# Patient Record
Sex: FEMALE | Race: WHITE | NOT HISPANIC OR LATINO | Employment: OTHER | URBAN - NONMETROPOLITAN AREA
[De-identification: names, ages, dates, MRNs, and addresses within clinical notes are randomized per-mention and may not be internally consistent; named-entity substitution may affect disease eponyms.]

---

## 2017-01-19 ENCOUNTER — OFFICE VISIT (OUTPATIENT)
Dept: OTOLARYNGOLOGY | Facility: CLINIC | Age: 57
End: 2017-01-19

## 2017-01-19 VITALS
BODY MASS INDEX: 44.11 KG/M2 | TEMPERATURE: 98.1 F | WEIGHT: 274.5 LBS | SYSTOLIC BLOOD PRESSURE: 137 MMHG | DIASTOLIC BLOOD PRESSURE: 93 MMHG | HEART RATE: 67 BPM | HEIGHT: 66 IN

## 2017-01-19 DIAGNOSIS — H93.13 TINNITUS, BILATERAL: ICD-10-CM

## 2017-01-19 DIAGNOSIS — R20.0 NUMBNESS: ICD-10-CM

## 2017-01-19 DIAGNOSIS — R51.9 HEADACHE, UNSPECIFIED HEADACHE TYPE: ICD-10-CM

## 2017-01-19 DIAGNOSIS — H57.11 EYE PAIN, RIGHT: ICD-10-CM

## 2017-01-19 DIAGNOSIS — H90.5 SNHL (SENSORINEURAL HEARING LOSS): Primary | ICD-10-CM

## 2017-01-19 PROCEDURE — 99213 OFFICE O/P EST LOW 20 MIN: CPT | Performed by: NURSE PRACTITIONER

## 2017-01-19 RX ORDER — MONTELUKAST SODIUM 10 MG/1
10 TABLET ORAL NIGHTLY
COMMUNITY
End: 2019-07-26 | Stop reason: SDUPTHER

## 2017-01-19 RX ORDER — EPINEPHRINE 0.15 MG/.3ML
INJECTION INTRAMUSCULAR
COMMUNITY

## 2017-01-19 RX ORDER — SIMVASTATIN 40 MG
TABLET ORAL
COMMUNITY
End: 2018-07-03

## 2017-01-19 RX ORDER — COVID-19 ANTIGEN TEST
KIT MISCELLANEOUS
COMMUNITY

## 2017-01-19 RX ORDER — DIPHENHYDRAMINE HCL 25 MG
TABLET ORAL EVERY 6 HOURS
COMMUNITY

## 2017-01-19 RX ORDER — ONDANSETRON 8 MG/1
TABLET, ORALLY DISINTEGRATING ORAL EVERY 8 HOURS
COMMUNITY

## 2017-01-19 RX ORDER — MECLIZINE HCL 12.5 MG/1
12.5 TABLET ORAL 3 TIMES DAILY PRN
COMMUNITY
End: 2019-06-17

## 2017-01-19 RX ORDER — PANTOPRAZOLE SODIUM 40 MG/1
TABLET, DELAYED RELEASE ORAL
COMMUNITY

## 2017-01-19 NOTE — PROGRESS NOTES
YOB: 1960  Location: [facility]  Location Address: [address] [place] [address], [facility], [address] [place] [address]  Location Phone: [phone]    Chief Complaint   Patient presents with   • Tinnitus       History of Present Illness  Alexandria Rizo is a 56 y.o. female.  Alexandria Rizo is here for follow up of ENT complaints. The patient has had problems with tinnitus, headaches.  The symptoms are localized to the right eye, centralized headaches. The patient has had severe symptoms. The symptoms have been present for the last 1 year . The symptoms are aggravated by  no identifiable factors . The symptoms are improved by no identifieable factors. She reports significantly worsening tinnitus not relieved by stopping aspirin or taking tinnitus formula.  She reports persistent headaches especially behind her right eye.  Has had some right facial numbness as well.  Denies vertigo.  Also had memory loss and trouble forming her words.  Denies worsening hearing.  Positive for  High pitched tinnitus, not pulsatile.       Past Medical History   Diagnosis Date   • Allergic rhinitis    • Arthritis    • Chronic rhinitis    • Deviated nasal septum    • Diabetes    • Diverticulosis of colon    • Dizziness    • GERD (gastroesophageal reflux disease)    • High cholesterol    • Hypertrophy of nasal turbinates    • Obstructive sleep apnea    • Peptic ulcer    • Polyposis of colon    • Sensorineural hearing loss    • Tinnitus        Past Surgical History   Procedure Laterality Date   • Appendectomy     • Cardiac catheterization     • Cholecystectomy     • Colon surgery     • Colonoscopy     • Endoscopy     • Hysterectomy     • Turbinoplasty           Current Outpatient Prescriptions:   •  diphenhydrAMINE (BENADRYL) 25 MG tablet, Every 6 (Six) Hours., Disp: , Rfl:   •  EPINEPHrine (EPIPEN JR) 0.15 MG/0.3ML solution auto-injector injection, Inject as directed, Disp: , Rfl:   •  meclizine (ANTIVERT) 12.5 MG tablet, Take 12.5 mg by  mouth 3 (Three) Times a Day As Needed for dizziness., Disp: , Rfl:   •  montelukast (SINGULAIR) 10 MG tablet, Take 10 mg by mouth Every Night., Disp: , Rfl:   •  Naproxen Sodium 220 MG capsule, Take by mouth, Disp: , Rfl:   •  ondansetron ODT (ZOFRAN-ODT) 8 MG disintegrating tablet, Every 8 (Eight) Hours., Disp: , Rfl:   •  pantoprazole (PROTONIX) 40 MG EC tablet, Take 40 mg by mouth daily.  , Disp: , Rfl:   •  simvastatin (ZOCOR) 40 MG tablet, Take 40 mg by mouth nightly.  , Disp: , Rfl:     Advair diskus [fluticasone-salmeterol]; Albuterol; Cymbalta [duloxetine hcl]; Dicyclomine; Lasix [furosemide]; Levaquin [levofloxacin]; Nasonex [mometasone furoate]; Spiriva handihaler [tiotropium bromide monohydrate]; Sulfa antibiotics; Symbicort [budesonide-formoterol fumarate]; and Topamax [topiramate]    Family History   Problem Relation Age of Onset   • Cancer Other    • Heart disease Other    • Hypertension Other    • Heart disease Mother    • Stroke Mother    • Heart disease Father        Social History     Social History   • Marital status:      Spouse name: N/A   • Number of children: N/A   • Years of education: N/A     Occupational History   • Not on file.     Social History Main Topics   • Smoking status: Never Smoker   • Smokeless tobacco: Not on file   • Alcohol use Yes      Comment: occaionally   • Drug use: Defer   • Sexual activity: Not on file     Other Topics Concern   • Not on file     Social History Narrative       Review of Systems   Constitutional: Negative.    HENT:        SEE HPI   Eyes: Negative.    Respiratory: Negative.    Cardiovascular: Negative.    Gastrointestinal: Negative.    Endocrine: Negative.    Genitourinary: Negative.    Musculoskeletal: Negative.    Skin: Negative.    Allergic/Immunologic: Negative.    Neurological: Negative.    Hematological: Negative.    Psychiatric/Behavioral: Negative.        Vitals:    01/19/17 0947   BP: 137/93   Pulse: 67   Temp: 98.1 °F (36.7 °C)        Objective     Physical Exam  CONSTITUTIONAL: well nourished, overweight,alert, oriented, in no acute distress     COMMUNICATION AND VOICE: able to communicate normally, normal voice quality    HEAD: normocephalic, no lesions, atraumatic, no tenderness, no masses     FACE: appearance normal, no lesions, no tenderness, no deformities, facial motion symmetric    SALIVARY GLANDS: parotid glands with no tenderness, no swelling, no masses, submandibular glands with normal size, nontender    EYES: ocular motility normal, eyelids normal, orbits normal, no proptosis, conjunctiva normal , pupils equal, round     EARS:  Hearing: response to conversational voice normal bilaterally   External Ears: auricles without lesions  Otoscopic: tympanic membrane appearance normal, no lesions, no perforation, normal mobility, no fluid    NOSE:  External Nose: structure normal, no tenderness on palpation, no nasal discharge, no lesions, no evidence of trauma, nostrils patent   Intranasal Exam: nasal mucosa normal, vestibule within normal limits, inferior turbinate normal, nasal septum midline     ORAL:  Lips: upper and lower lips without lesion   Teeth: dentition within normal limits for age   Gums: gingivae healthy   Oral Mucosa: oral mucosa normal, no mucosal lesions   Floor of Mouth: Warthin’s duct patent, mucosa normal  Tongue: lingual mucosa normal without lesions, normal tongue mobility   Palate: soft and hard palates with normal mucosa and structure  Oropharynx: oropharyngeal mucosa normal    NECK: neck appearance normal, no mass,  noted without erythema or tenderness    THYROID: no overt thyromegaly, no tenderness, nodules or mass present on palpation, position midline     LYMPH NODES: no lymphadenopathy    CHEST/RESPIRATORY: respiratory effort normal     CARDIOVASCULAR:  extremities without cyanosis or edema      NEUROLOGIC/PSYCHIATRIC: oriented to time, place and person, mood normal, affect appropriate, CN II-XII intact  grossly    Assessment/Plan   Alexandria was seen today for tinnitus.    Diagnoses and all orders for this visit:    SNHL (sensorineural hearing loss)  -     MRI Internal Auditory Canal With Wo; Future  -     MRI Brain With & Without Contrast; Future    Tinnitus, bilateral  -     MRI Internal Auditory Canal With Wo; Future  -     MRI Brain With & Without Contrast; Future    Persistent Headache, unspecified headache type  -     MRI Internal Auditory Canal With Wo; Future  -     MRI Brain With & Without Contrast; Future    Eye pain, right  -     MRI Internal Auditory Canal With Wo; Future  -     MRI Brain With & Without Contrast; Future    Numbness  -     MRI Internal Auditory Canal With Wo; Future  -     MRI Brain With & Without Contrast; Future      * Surgery not found *  Orders Placed This Encounter   Procedures   • MRI Internal Auditory Canal With Wo     Standing Status:   Future     Standing Expiration Date:   1/19/2018     Order Specific Question:   Reason for Exam:     Answer:   persistent headaches, right eye pain, memory loss, tinnitus, asymmetrical hearing loss     Order Specific Question:   Is the patient pregnant?     Answer:   No   • MRI Brain With & Without Contrast     Standing Status:   Future     Standing Expiration Date:   1/19/2018     Order Specific Question:   Reason for Exam:     Answer:   persistent headaches, right eye pain, memory loss, tinnitus, asymmetrical hearing loss     Order Specific Question:   Is the patient pregnant?     Answer:   No     Return in about 3 months (around 4/19/2017).       Patient Instructions   Tinnitus  Tinnitus refers to hearing a sound when there is no actual source for that sound. This is often described as ringing in the ears. However, people with this condition may hear a variety of noises. A person may hear the sound in one ear or in both ears.   The sounds of tinnitus can be soft, loud, or somewhere in between. Tinnitus can last for a few seconds or can be  constant for days. It may go away without treatment and come back at various times. When tinnitus is constant or happens often, it can lead to other problems, such as trouble sleeping and trouble concentrating.  Almost everyone experiences tinnitus at some point. Tinnitus that is long-lasting (chronic) or comes back often is a problem that may require medical attention.   CAUSES   The cause of tinnitus is often not known. In some cases, it can result from other problems or conditions, including:   · Exposure to loud noises from machinery, music, or other sources.  · Hearing loss.  · Ear or sinus infections.  · Earwax buildup.  · A foreign object in the ear.  · Use of certain medicines.  · Use of alcohol and caffeine.  · High blood pressure.  · Heart diseases.  · Anemia.  · Allergies.  · Meniere disease.  · Thyroid problems.  · Tumors.  · An enlarged part of a weakened blood vessel (aneurysm).  SYMPTOMS  The main symptom of tinnitus is hearing a sound when there is no source for that sound. It may sound like:   · Buzzing.  · Roaring.  · Ringing.  · Blowing air, similar to the sound heard when you listen to a seashell.  · Hissing.  · Whistling.  · Sizzling.  · Humming.  · Running water.  · A sustained musical note.  DIAGNOSIS   Tinnitus is diagnosed based on your symptoms. Your health care provider will do a physical exam. A comprehensive hearing exam (audiologic exam) will be done if your tinnitus:   · Affects only one ear (unilateral).  · Causes hearing difficulties.  · Lasts 6 months or longer.  You may also need to see a health care provider who specializes in hearing disorders (audiologist). You may be asked to complete a questionnaire to determine the severity of your tinnitus. Tests may be done to help determine the cause and to rule out other conditions. These can include:  · Imaging studies of your head and brain, such as:    A CT scan.    An MRI.  · An imaging study of your blood vessels  (angiogram).  TREATMENT   Treating an underlying medical condition can sometimes make tinnitus go away. If your tinnitus continues, other treatments may include:  · Medicines, such as certain antidepressants or sleeping aids.  · Sound generators to mask the tinnitus. These include:  ¨ Tabletop sound machines that play relaxing sounds to help you fall asleep.  ¨ Wearable devices that fit in your ear and play sounds or music.  ¨ A small device that uses headphones to deliver a signal embedded in music (acoustic neural stimulation). In time, this may change the pathways of your brain and make you less sensitive to tinnitus. This device is used for very severe cases when no other treatment is working.  · Therapy and counseling to help you manage the stress of living with tinnitus.  · Using hearing aids or cochlear implants, if your tinnitus is related to hearing loss.  HOME CARE INSTRUCTIONS  · When possible, avoid being in loud places and being exposed to loud sounds.  · Wear hearing protection, such as earplugs, when you are exposed to loud noises.  · Do not take stimulants, such as nicotine, alcohol, or caffeine.  · Practice techniques for reducing stress, such as meditation, yoga, or deep breathing.  · Use a white noise machine, a humidifier, or other devices to mask the sound of tinnitus.  · Sleep with your head slightly raised. This may reduce the impact of tinnitus.  · Try to get plenty of rest each night.  SEEK MEDICAL CARE IF:  · You have tinnitus in just one ear.  · Your tinnitus continues for 3 weeks or longer without stopping.  · Home care measures are not helping.  · You have tinnitus after a head injury.  · You have tinnitus along with any of the following:    Dizziness.    Loss of balance.    Nausea and vomiting.     This information is not intended to replace advice given to you by your health care provider. Make sure you discuss any questions you have with your health care provider.     Document  Released: 12/18/2006 Document Revised: 01/08/2016 Document Reviewed: 05/20/2015  [employer] Interactive Patient Education ©2016 [employer]    Tinnitus precautions discussed.  Will check MRI and review for abnormalities.  Call for problems or worsening symptoms

## 2017-01-19 NOTE — MR AVS SNAPSHOT
Alexandria JOAO Rizo   1/19/2017 10:00 AM   Office Visit    Dept Phone:  [phone]   Encounter #:  [id number]    Provider:  JAMAL Varner   Department:  [facility]                Your Full Care Plan              Your Updated Medication List          This list is accurate as of: 1/19/17 10:27 AM.  Always use your most recent med list.                diphenhydrAMINE 25 MG tablet   Commonly known as:  BENADRYL       EPINEPHrine 0.15 MG/0.3ML solution auto-injector injection   Commonly known as:  EPIPEN JR       meclizine 12.5 MG tablet   Commonly known as:  ANTIVERT       montelukast 10 MG tablet   Commonly known as:  SINGULAIR       Naproxen Sodium 220 MG capsule       ondansetron ODT 8 MG disintegrating tablet   Commonly known as:  ZOFRAN-ODT       pantoprazole 40 MG EC tablet   Commonly known as:  PROTONIX       simvastatin 40 MG tablet   Commonly known as:  ZOCOR               Instructions    Tinnitus  Tinnitus refers to hearing a sound when there is no actual source for that sound. This is often described as ringing in the ears. However, people with this condition may hear a variety of noises. A person may hear the sound in one ear or in both ears.   The sounds of tinnitus can be soft, loud, or somewhere in between. Tinnitus can last for a few seconds or can be constant for days. It may go away without treatment and come back at various times. When tinnitus is constant or happens often, it can lead to other problems, such as trouble sleeping and trouble concentrating.  Almost everyone experiences tinnitus at some point. Tinnitus that is long-lasting (chronic) or comes back often is a problem that may require medical attention.   CAUSES   The cause of tinnitus is often not known. In some cases, it can result from other problems or conditions, including:   · Exposure to loud noises from machinery, music, or other sources.  · Hearing loss.  · Ear or sinus  infections.  · Earwax buildup.  · A foreign object in the ear.  · Use of certain medicines.  · Use of alcohol and caffeine.  · High blood pressure.  · Heart diseases.  · Anemia.  · Allergies.  · Meniere disease.  · Thyroid problems.  · Tumors.  · An enlarged part of a weakened blood vessel (aneurysm).  SYMPTOMS  The main symptom of tinnitus is hearing a sound when there is no source for that sound. It may sound like:   · Buzzing.  · Roaring.  · Ringing.  · Blowing air, similar to the sound heard when you listen to a seashell.  · Hissing.  · Whistling.  · Sizzling.  · Humming.  · Running water.  · A sustained musical note.  DIAGNOSIS   Tinnitus is diagnosed based on your symptoms. Your health care provider will do a physical exam. A comprehensive hearing exam (audiologic exam) will be done if your tinnitus:   · Affects only one ear (unilateral).  · Causes hearing difficulties.  · Lasts 6 months or longer.  You may also need to see a health care provider who specializes in hearing disorders (audiologist). You may be asked to complete a questionnaire to determine the severity of your tinnitus. Tests may be done to help determine the cause and to rule out other conditions. These can include:  · Imaging studies of your head and brain, such as:    A CT scan.    An MRI.  · An imaging study of your blood vessels (angiogram).  TREATMENT   Treating an underlying medical condition can sometimes make tinnitus go away. If your tinnitus continues, other treatments may include:  · Medicines, such as certain antidepressants or sleeping aids.  · Sound generators to mask the tinnitus. These include:  ¨ Tabletop sound machines that play relaxing sounds to help you fall asleep.  ¨ Wearable devices that fit in your ear and play sounds or music.  ¨ A small device that uses headphones to deliver a signal embedded in music (acoustic neural stimulation). In time, this may change the pathways of your brain and make you less sensitive to  tinnitus. This device is used for very severe cases when no other treatment is working.  · Therapy and counseling to help you manage the stress of living with tinnitus.  · Using hearing aids or cochlear implants, if your tinnitus is related to hearing loss.  HOME CARE INSTRUCTIONS  · When possible, avoid being in loud places and being exposed to loud sounds.  · Wear hearing protection, such as earplugs, when you are exposed to loud noises.  · Do not take stimulants, such as nicotine, alcohol, or caffeine.  · Practice techniques for reducing stress, such as meditation, yoga, or deep breathing.  · Use a white noise machine, a humidifier, or other devices to mask the sound of tinnitus.  · Sleep with your head slightly raised. This may reduce the impact of tinnitus.  · Try to get plenty of rest each night.  SEEK MEDICAL CARE IF:  · You have tinnitus in just one ear.  · Your tinnitus continues for 3 weeks or longer without stopping.  · Home care measures are not helping.  · You have tinnitus after a head injury.  · You have tinnitus along with any of the following:    Dizziness.    Loss of balance.    Nausea and vomiting.     This information is not intended to replace advice given to you by your health care provider. Make sure you discuss any questions you have with your health care provider.     Document Released: 12/18/2006 Document Revised: 01/08/2016 Document Reviewed: 05/20/2015  [employer] Interactive Patient Education ©2016 [employer]       Patient Instructions History      Upcoming Appointments     Visit Type Date Time Department    FOLLOW UP 1/19/2017 10:00 AM [facility] [place]    FOLLOW UP 4/19/2017  9:00 AM [facility] [place]      [email or web address] Signup     [facility] [email or web address] allows you to send messages to your doctor, view your test results, renew your prescriptions, schedule appointments, and more. To sign up, go to [email or web address] and click on the Sign Up Now link in the New User? box. Enter your [email or web address]  "Activation Code exactly as it appears below along with the last four digits of your Social Security Number and your Date of Birth () to complete the sign-up process. If you do not sign up before the expiration date, you must request a new code.    Shira Activation Code: [email or web address]  Expires: 2017 10:23 AM    If you have questions, you can email [email or web address] or call [phone] to talk to our [employer] staff. Remember, [employer] is NOT to be used for urgent needs. For medical emergencies, dial 911.               Other Info from Your Visit           Your Appointments     2017  9:00 AM CDT   Follow Up with JAMAL Varner   [facility] (--)    [address] [place] [address]   [address]  [facility] [address] [phone]           Arrive 15 minutes prior to appointment.              Allergies     Advair Diskus [Fluticasone-salmeterol]      Albuterol      Cymbalta [Duloxetine Hcl]      Dicyclomine      Lasix [Furosemide]      Levaquin [Levofloxacin]      Nasonex [Mometasone Furoate]      Spiriva Handihaler [Tiotropium Bromide Monohydrate]      Sulfa Antibiotics      Symbicort [Budesonide-formoterol Fumarate]      Topamax [Topiramate]        Reason for Visit     Tinnitus           Vital Signs     Blood Pressure Pulse Temperature Height Weight Body Mass Index    137/93 67 98.1 °F (36.7 °C) 66\" (167.6 cm) 274 lb 8 oz (125 kg) 44.31 kg/m2    Smoking Status                   Never Smoker             "

## 2017-01-19 NOTE — PATIENT INSTRUCTIONS
Tinnitus  Tinnitus refers to hearing a sound when there is no actual source for that sound. This is often described as ringing in the ears. However, people with this condition may hear a variety of noises. A person may hear the sound in one ear or in both ears.   The sounds of tinnitus can be soft, loud, or somewhere in between. Tinnitus can last for a few seconds or can be constant for days. It may go away without treatment and come back at various times. When tinnitus is constant or happens often, it can lead to other problems, such as trouble sleeping and trouble concentrating.  Almost everyone experiences tinnitus at some point. Tinnitus that is long-lasting (chronic) or comes back often is a problem that may require medical attention.   CAUSES   The cause of tinnitus is often not known. In some cases, it can result from other problems or conditions, including:   · Exposure to loud noises from machinery, music, or other sources.  · Hearing loss.  · Ear or sinus infections.  · Earwax buildup.  · A foreign object in the ear.  · Use of certain medicines.  · Use of alcohol and caffeine.  · High blood pressure.  · Heart diseases.  · Anemia.  · Allergies.  · Meniere disease.  · Thyroid problems.  · Tumors.  · An enlarged part of a weakened blood vessel (aneurysm).  SYMPTOMS  The main symptom of tinnitus is hearing a sound when there is no source for that sound. It may sound like:   · Buzzing.  · Roaring.  · Ringing.  · Blowing air, similar to the sound heard when you listen to a seashell.  · Hissing.  · Whistling.  · Sizzling.  · Humming.  · Running water.  · A sustained musical note.  DIAGNOSIS   Tinnitus is diagnosed based on your symptoms. Your health care provider will do a physical exam. A comprehensive hearing exam (audiologic exam) will be done if your tinnitus:   · Affects only one ear (unilateral).  · Causes hearing difficulties.  · Lasts 6 months or longer.  You may also need to see a health care provider  who specializes in hearing disorders (audiologist). You may be asked to complete a questionnaire to determine the severity of your tinnitus. Tests may be done to help determine the cause and to rule out other conditions. These can include:  · Imaging studies of your head and brain, such as:    A CT scan.    An MRI.  · An imaging study of your blood vessels (angiogram).  TREATMENT   Treating an underlying medical condition can sometimes make tinnitus go away. If your tinnitus continues, other treatments may include:  · Medicines, such as certain antidepressants or sleeping aids.  · Sound generators to mask the tinnitus. These include:  ¨ Tabletop sound machines that play relaxing sounds to help you fall asleep.  ¨ Wearable devices that fit in your ear and play sounds or music.  ¨ A small device that uses headphones to deliver a signal embedded in music (acoustic neural stimulation). In time, this may change the pathways of your brain and make you less sensitive to tinnitus. This device is used for very severe cases when no other treatment is working.  · Therapy and counseling to help you manage the stress of living with tinnitus.  · Using hearing aids or cochlear implants, if your tinnitus is related to hearing loss.  HOME CARE INSTRUCTIONS  · When possible, avoid being in loud places and being exposed to loud sounds.  · Wear hearing protection, such as earplugs, when you are exposed to loud noises.  · Do not take stimulants, such as nicotine, alcohol, or caffeine.  · Practice techniques for reducing stress, such as meditation, yoga, or deep breathing.  · Use a white noise machine, a humidifier, or other devices to mask the sound of tinnitus.  · Sleep with your head slightly raised. This may reduce the impact of tinnitus.  · Try to get plenty of rest each night.  SEEK MEDICAL CARE IF:  · You have tinnitus in just one ear.  · Your tinnitus continues for 3 weeks or longer without stopping.  · Home care measures are not  helping.  · You have tinnitus after a head injury.  · You have tinnitus along with any of the following:    Dizziness.    Loss of balance.    Nausea and vomiting.     This information is not intended to replace advice given to you by your health care provider. Make sure you discuss any questions you have with your health care provider.     Document Released: 12/18/2006 Document Revised: 01/08/2016 Document Reviewed: 05/20/2015  [employer] Interactive Patient Education ©2016 [employer]    Tinnitus precautions discussed.  Will check MRI and review for abnormalities.  Call for problems or worsening symptoms

## 2017-01-30 ENCOUNTER — HOSPITAL ENCOUNTER (OUTPATIENT)
Dept: MRI IMAGING | Facility: HOSPITAL | Age: 57
Discharge: HOME | End: 2017-01-30
Admitting: NURSE PRACTITIONER

## 2017-01-30 ENCOUNTER — HOSPITAL ENCOUNTER (OUTPATIENT)
Dept: MRI IMAGING | Facility: HOSPITAL | Age: 57
Discharge: HOME | End: 2017-01-30

## 2017-01-30 DIAGNOSIS — H90.5 SNHL (SENSORINEURAL HEARING LOSS): ICD-10-CM

## 2017-01-30 DIAGNOSIS — R51.9 HEADACHE, UNSPECIFIED HEADACHE TYPE: ICD-10-CM

## 2017-01-30 DIAGNOSIS — H93.13 TINNITUS, BILATERAL: ICD-10-CM

## 2017-01-30 DIAGNOSIS — H57.11 EYE PAIN, RIGHT: ICD-10-CM

## 2017-01-30 DIAGNOSIS — R20.0 NUMBNESS: ICD-10-CM

## 2017-01-30 LAB — CREAT BLDA-MCNC: 0.8 MG/DL (ref 0.6–1.3)

## 2017-01-30 PROCEDURE — A9577 INJ MULTIHANCE: HCPCS | Performed by: NURSE PRACTITIONER

## 2017-01-30 PROCEDURE — 70553 MRI BRAIN STEM W/O & W/DYE: CPT

## 2017-01-30 PROCEDURE — 0 GADOBENATE DIMEGLUMINE 529 MG/ML SOLUTION: Performed by: NURSE PRACTITIONER

## 2017-01-30 PROCEDURE — 82565 ASSAY OF CREATININE: CPT

## 2017-01-30 RX ADMIN — GADOBENATE DIMEGLUMINE 20 ML: 529 INJECTION, SOLUTION INTRAVENOUS at 14:15

## 2017-01-31 DIAGNOSIS — N63.0 LUMP OR MASS IN BREAST: Primary | ICD-10-CM

## 2017-02-07 ENCOUNTER — TELEPHONE (OUTPATIENT)
Dept: SURGERY | Age: 57
End: 2017-02-07

## 2017-02-08 ENCOUNTER — HOSPITAL ENCOUNTER (OUTPATIENT)
Dept: WOMENS IMAGING | Age: 57
Discharge: HOME | End: 2017-02-08
Payer: MEDICARE

## 2017-02-08 DIAGNOSIS — N63.0 LUMP OR MASS IN BREAST: ICD-10-CM

## 2017-02-08 PROCEDURE — 76642 ULTRASOUND BREAST LIMITED: CPT

## 2017-02-27 ENCOUNTER — OFFICE VISIT (OUTPATIENT)
Dept: SURGERY | Age: 57
End: 2017-02-27
Payer: MEDICARE

## 2017-02-27 VITALS
SYSTOLIC BLOOD PRESSURE: 130 MMHG | BODY MASS INDEX: 44.84 KG/M2 | WEIGHT: 279 LBS | HEART RATE: 78 BPM | HEIGHT: 66 IN | RESPIRATION RATE: 20 BRPM | DIASTOLIC BLOOD PRESSURE: 72 MMHG

## 2017-02-27 DIAGNOSIS — N63.0 LUMP OR MASS IN BREAST: ICD-10-CM

## 2017-02-27 DIAGNOSIS — N60.19 DIFFUSE CYSTIC MASTOPATHY, UNSPECIFIED LATERALITY: ICD-10-CM

## 2017-02-27 DIAGNOSIS — Z12.31 VISIT FOR SCREENING MAMMOGRAM: Primary | ICD-10-CM

## 2017-02-27 DIAGNOSIS — N62 MACROMASTIA: ICD-10-CM

## 2017-02-27 PROCEDURE — G8419 CALC BMI OUT NRM PARAM NOF/U: HCPCS | Performed by: SURGERY

## 2017-02-27 PROCEDURE — 1036F TOBACCO NON-USER: CPT | Performed by: SURGERY

## 2017-02-27 PROCEDURE — G8427 DOCREV CUR MEDS BY ELIG CLIN: HCPCS | Performed by: SURGERY

## 2017-02-27 PROCEDURE — 99213 OFFICE O/P EST LOW 20 MIN: CPT | Performed by: SURGERY

## 2017-02-27 PROCEDURE — 3017F COLORECTAL CA SCREEN DOC REV: CPT | Performed by: SURGERY

## 2017-02-27 PROCEDURE — 3014F SCREEN MAMMO DOC REV: CPT | Performed by: SURGERY

## 2017-02-27 PROCEDURE — G8484 FLU IMMUNIZE NO ADMIN: HCPCS | Performed by: SURGERY

## 2017-02-27 PROCEDURE — G8599 NO ASA/ANTIPLAT THER USE RNG: HCPCS | Performed by: SURGERY

## 2017-04-19 ENCOUNTER — OFFICE VISIT (OUTPATIENT)
Dept: OTOLARYNGOLOGY | Facility: CLINIC | Age: 57
End: 2017-04-19

## 2017-04-19 VITALS
TEMPERATURE: 98.7 F | BODY MASS INDEX: 44.6 KG/M2 | WEIGHT: 277.5 LBS | HEART RATE: 84 BPM | SYSTOLIC BLOOD PRESSURE: 160 MMHG | DIASTOLIC BLOOD PRESSURE: 89 MMHG | HEIGHT: 66 IN

## 2017-04-19 DIAGNOSIS — R51.9 HEADACHE, UNSPECIFIED HEADACHE TYPE: ICD-10-CM

## 2017-04-19 DIAGNOSIS — H90.5 SNHL (SENSORINEURAL HEARING LOSS): ICD-10-CM

## 2017-04-19 DIAGNOSIS — H57.11 EYE PAIN, RIGHT: ICD-10-CM

## 2017-04-19 DIAGNOSIS — H93.13 TINNITUS, BILATERAL: Primary | ICD-10-CM

## 2017-04-19 DIAGNOSIS — R20.0 NUMBNESS: ICD-10-CM

## 2017-04-19 PROCEDURE — 99213 OFFICE O/P EST LOW 20 MIN: CPT | Performed by: NURSE PRACTITIONER

## 2017-04-19 RX ORDER — ALBUTEROL SULFATE 90 UG/1
2 AEROSOL, METERED RESPIRATORY (INHALATION) EVERY 4 HOURS PRN
COMMUNITY

## 2017-04-19 RX ORDER — CALCIUM CARBONATE 750 MG/1
750 TABLET, CHEWABLE ORAL DAILY
COMMUNITY
End: 2019-06-17

## 2017-04-19 RX ORDER — EPINEPHRINE 0.15 MG/.3ML
INJECTION INTRAMUSCULAR
COMMUNITY
End: 2017-04-19 | Stop reason: ALTCHOICE

## 2017-04-19 RX ORDER — ASPIRIN 81 MG/1
81 TABLET, CHEWABLE ORAL DAILY
COMMUNITY

## 2017-04-19 NOTE — PROGRESS NOTES
YOB: 1960  Location: [facility]  Location Address: [address] [place] [address], [facility], [address] [place] [address]  Location Phone: [phone]    Chief Complaint   Patient presents with   • Ear Problem       History of Present Illness  Alexandria Rizo is a 56 y.o. female.  Alexandria Rizo is here for follow up of ENT complaints. The patient has had problems with tinnitus, headaches.  The symptoms are localized to the right eye, centralized headaches. The patient has had severe symptoms. The symptoms have been present for the last 1 year . The symptoms are aggravated by  no identifiable factors . The symptoms are improved by no identifieable factors. She reports significantly worsening tinnitus not relieved by stopping aspirin or taking tinnitus formula.  She reports persistent headaches especially behind her right eye.  Has had some right facial numbness as well.  Denies vertigo.  Also had memory loss and trouble forming her words.  Denies worsening hearing.  Positive for  High pitched tinnitus, not pulsatile.  She has been on nasal a couple course of oral abx since her last visit. She is not using her nasal sprays as prescribed.        MRI Brain With & Without Contrast [[id number]] (Order [id number])   Status: Final result   PACS Images   Radiology Images   Study Result   EXAMINATION: MRI BRAIN W WO CONTRAST-, MRI INTERNAL AUDITORY CANAL W WO-  2017 3:13 PM CST       Clinical History: SNHL (sensorineural hearing loss) [H90.5 (ICD-10-CM)];  Tinnitus, bilateral [H93.13 (ICD-10-CM)]; Persistent Headache,  unspecified headache type [R51 (ICD-10-CM)]; Eye pain, right [H57.11  (ICD-10-CM)]; Numbness [R20.0 (ICD-10-CM)]  persistent headaches, right eye pain, memory loss, tinnitus,  asymmetrical hearing loss; H90.5-Unspecified sensorineural hearing loss;  H93.13-Tinnitus, bilateral; R51-Headache; H57.11-Ocular pain, right eye;  R20.0-Anesthesia of skin      Comparison Study: 2013.       Technique: Multiplanar,  multisequence MR imaging of the brain and  internal auditory canals was obtained before and after the intravenous  infusion of gadolinium contrast.      Findings:   Midline brain is nondisplaced. Ventricles are normal in size, symmetry,  and configuration. Basilar cisterns are preserved. Gray and white matter  demonstrates a few scattered foci of increased FLAIR signal intensity  suggesting mild chronic microvascular ischemia. No masses or areas of  midline shift. No abnormal areas of postcontrast enhancement within the  brain. Incidental note of partially empty sella configuration.  Incidental note of air-fluid level in the sphenoid sinus.      Brainstem is normal in signal. Surrounding structures are unremarkable.       Internal auditory canals demonstrate normal vascular and neural  structures with no evidence of enhancing mass lesion. The inner ear  morphology and signal are normal.       Vestibular aqueducts are not demonstrated and are not likely to be  enlarged. Vascular flow-voids are appropriate.       IMPRESSION:  Impression:   1. Very mild changes of chronic microvascular ischemia are suspected.  2. Probable mild sphenoid sinusitis.  3. No enhancing masses or other abnormalities demonstrated in the IACs.          This report was finalized on 01/30/2017 15:44 by Dr. Yosi Zuniga MD.            Past Medical History:   Diagnosis Date   • Allergic rhinitis    • Arthritis    • Chronic rhinitis    • Deviated nasal septum    • Diabetes    • Diverticulosis of colon    • Dizziness    • GERD (gastroesophageal reflux disease)    • High cholesterol    • Hypertrophy of nasal turbinates    • Obstructive sleep apnea    • Peptic ulcer    • Polyposis of colon    • Sensorineural hearing loss    • Tinnitus        Past Surgical History:   Procedure Laterality Date   • APPENDECTOMY     • CARDIAC CATHETERIZATION     • CHOLECYSTECTOMY     • COLON SURGERY     • COLONOSCOPY     • ENDOSCOPY     • HYSTERECTOMY     •  TURBINOPLASTY           Current Outpatient Prescriptions:   •  albuterol (PROVENTIL HFA;VENTOLIN HFA) 108 (90 BASE) MCG/ACT inhaler, Inhale 2 puffs Every 4 (Four) Hours As Needed for Wheezing., Disp: , Rfl:   •  aspirin 81 MG chewable tablet, Chew 81 mg Daily., Disp: , Rfl:   •  calcium carbonate EX (TUMS EX) 750 MG chewable tablet, Chew 750 mg Daily., Disp: , Rfl:   •  diphenhydrAMINE (BENADRYL) 25 MG tablet, Every 6 (Six) Hours., Disp: , Rfl:   •  EPINEPHrine (EPIPEN JR) 0.15 MG/0.3ML solution auto-injector injection, Inject as directed, Disp: , Rfl:   •  meclizine (ANTIVERT) 12.5 MG tablet, Take 12.5 mg by mouth 3 (Three) Times a Day As Needed for dizziness., Disp: , Rfl:   •  montelukast (SINGULAIR) 10 MG tablet, Take 10 mg by mouth Every Night., Disp: , Rfl:   •  Multiple Vitamins-Minerals (MULTIVITAL PO), Take  by mouth., Disp: , Rfl:   •  Naproxen Sodium 220 MG capsule, Take by mouth, Disp: , Rfl:   •  ondansetron ODT (ZOFRAN-ODT) 8 MG disintegrating tablet, Every 8 (Eight) Hours., Disp: , Rfl:   •  pantoprazole (PROTONIX) 40 MG EC tablet, Take 40 mg by mouth daily.  , Disp: , Rfl:   •  simvastatin (ZOCOR) 40 MG tablet, Take 40 mg by mouth nightly.  , Disp: , Rfl:     Nasonex [mometasone furoate]; Spiriva handihaler [tiotropium bromide monohydrate]; Aspergillus (mixed) allergy skin test; Advair diskus [fluticasone-salmeterol]; Albuterol; Albuterol sulfate; Ampicillin; Beclomethasone; Cymbalta [duloxetine hcl]; Dicyclomine; Lasix [furosemide]; Levaquin [levofloxacin]; Sulfa antibiotics; Symbicort [budesonide-formoterol fumarate]; and Topamax [topiramate]    Family History   Problem Relation Age of Onset   • Cancer Other    • Heart disease Other    • Hypertension Other    • Heart disease Mother    • Stroke Mother    • Heart disease Father        Social History     Social History   • Marital status:      Spouse name: N/A   • Number of children: N/A   • Years of education: N/A     Occupational History   •  Not on file.     Social History Main Topics   • Smoking status: Never Smoker   • Smokeless tobacco: Not on file   • Alcohol use Yes      Comment: socially   • Drug use: Defer   • Sexual activity: Not on file     Other Topics Concern   • Not on file     Social History Narrative       Review of Systems   Constitutional: Negative.    HENT:        SEE HPI   Eyes: Negative.    Respiratory: Negative.    Cardiovascular: Negative.    Gastrointestinal: Negative.    Endocrine: Negative.    Genitourinary: Negative.    Musculoskeletal: Negative.    Skin: Negative.    Allergic/Immunologic: Negative.    Neurological: Negative.    Hematological: Negative.    Psychiatric/Behavioral: Negative.        Vitals:    04/19/17 0857   BP: 160/89   Pulse: 84   Temp: 98.7 °F (37.1 °C)       Objective     Physical Exam  CONSTITUTIONAL: well nourished, overweight,alert, oriented, in no acute distress     COMMUNICATION AND VOICE: able to communicate normally, normal voice quality    HEAD: normocephalic, no lesions, atraumatic, no tenderness, no masses     FACE: appearance normal, no lesions, no tenderness, no deformities, facial motion symmetric    SALIVARY GLANDS: parotid glands with no tenderness, no swelling, no masses, submandibular glands with normal size, nontender    EYES: ocular motility normal, eyelids normal, orbits normal, no proptosis, conjunctiva normal , pupils equal, round     EARS:  Hearing: response to conversational voice normal bilaterally   External Ears: auricles without lesions  Otoscopic: tympanic membrane appearance normal, no lesions, no perforation, normal mobility, no fluid    NOSE:  External Nose: structure normal, no tenderness on palpation, no nasal discharge, no lesions, no evidence of trauma, nostrils patent   Intranasal Exam: nasal mucosa edema, vestibule within normal limits, inferior turbinate normal, nasal septum midline     ORAL:  Lips: upper and lower lips without lesion   Teeth: dentition within normal  limits for age   Gums: gingivae healthy   Oral Mucosa: oral mucosa normal, no mucosal lesions   Floor of Mouth: Warthin’s duct patent, mucosa normal  Tongue: lingual mucosa normal without lesions, normal tongue mobility   Palate: soft and hard palates with normal mucosa and structure  Oropharynx: oropharyngeal mucosa normal    NECK: neck appearance normal, no mass,  noted without erythema or tenderness    THYROID: no overt thyromegaly, no tenderness, nodules or mass present on palpation, position midline     LYMPH NODES: no lymphadenopathy    CHEST/RESPIRATORY: respiratory effort normal     CARDIOVASCULAR:  extremities without cyanosis or edema      NEUROLOGIC/PSYCHIATRIC: oriented to time, place and person, mood normal, affect appropriate, CN II-XII intact grossly    Assessment/Plan   Alexandria was seen today for ear problem.    Diagnoses and all orders for this visit:    Tinnitus, bilateral    SNHL (sensorineural hearing loss)    Numbness    Eye pain, right    Persistent Headache, unspecified headache type      * Surgery not found *  No orders of the defined types were placed in this encounter.    No Follow-up on file.       Patient Instructions   Discussed importance of nasal sprays.  Suspect her balance problems maybe more r/t to her diabetes and poss peripheral neuropathy.  Recommend to continue current tx and try Nasacort.  Recommend BP control as well - she is only taking her Bumex as needed    Avoid loud noise exposure. Protect hearing in with extreme loud noise.   Recommend fan, sound machine, white noise from TV for tinnitus masking. Avoid excessive caffeine, decrease stress level, monitor BP regularly, routine sleep schedule. Low Sodium Diet.   Tinnitus Handout   Hearing loss handout   Appt with hearing aid specialist for hearing aid fitting when patient so desires.   Call for change or worsening symptoms not controlled by current treatment regimen.

## 2017-04-19 NOTE — PATIENT INSTRUCTIONS
Discussed importance of nasal sprays.  Suspect her balance problems maybe more r/t to her diabetes and poss peripheral neuropathy.  Recommend to continue current tx and try Nasacort.  Recommend BP control as well - she is only taking her Bumex as needed    Avoid loud noise exposure. Protect hearing in with extreme loud noise.   Recommend fan, sound machine, white noise from TV for tinnitus masking. Avoid excessive caffeine, decrease stress level, monitor BP regularly, routine sleep schedule. Low Sodium Diet.   Tinnitus Handout   Hearing loss handout   Appt with hearing aid specialist for hearing aid fitting when patient so desires.   Call for change or worsening symptoms not controlled by current treatment regimen.

## 2017-04-24 DIAGNOSIS — N63.0 LUMP OR MASS IN BREAST: Primary | ICD-10-CM

## 2017-04-25 ENCOUNTER — TELEPHONE (OUTPATIENT)
Dept: SURGERY | Age: 57
End: 2017-04-25

## 2017-05-08 ENCOUNTER — OFFICE VISIT (OUTPATIENT)
Dept: NEUROLOGY | Age: 57
End: 2017-05-08
Payer: MEDICARE

## 2017-05-08 VITALS
WEIGHT: 274 LBS | SYSTOLIC BLOOD PRESSURE: 124 MMHG | RESPIRATION RATE: 18 BRPM | OXYGEN SATURATION: 97 % | HEART RATE: 71 BPM | DIASTOLIC BLOOD PRESSURE: 89 MMHG | HEIGHT: 66 IN | BODY MASS INDEX: 44.03 KG/M2

## 2017-05-08 DIAGNOSIS — H81.313 VERTIGO, AURAL, BILATERAL: ICD-10-CM

## 2017-05-08 DIAGNOSIS — G45.1 HEMISPHERIC CAROTID ARTERY SYNDROME: ICD-10-CM

## 2017-05-08 DIAGNOSIS — G43.019 INTRACTABLE MIGRAINE WITHOUT AURA AND WITHOUT STATUS MIGRAINOSUS: ICD-10-CM

## 2017-05-08 DIAGNOSIS — G25.81 RESTLESS LEGS SYNDROME: ICD-10-CM

## 2017-05-08 DIAGNOSIS — R41.3 MEMORY LOSS: ICD-10-CM

## 2017-05-08 DIAGNOSIS — G47.33 SLEEP APNEA, OBSTRUCTIVE: Primary | ICD-10-CM

## 2017-05-08 DIAGNOSIS — E11.42 DIABETIC POLYNEUROPATHY ASSOCIATED WITH TYPE 2 DIABETES MELLITUS (HCC): ICD-10-CM

## 2017-05-08 LAB
FOLATE: >20 NG/ML (ref 4.8–37.3)
SEDIMENTATION RATE, ERYTHROCYTE: 31 MM/HR (ref 0–25)
TSH SERPL DL<=0.05 MIU/L-ACNC: 1.03 UIU/ML (ref 0.27–4.2)
VITAMIN B-12: 833 PG/ML (ref 211–946)

## 2017-05-08 PROCEDURE — 99214 OFFICE O/P EST MOD 30 MIN: CPT | Performed by: PSYCHIATRY & NEUROLOGY

## 2017-05-08 PROCEDURE — 3014F SCREEN MAMMO DOC REV: CPT | Performed by: PSYCHIATRY & NEUROLOGY

## 2017-05-08 PROCEDURE — 3046F HEMOGLOBIN A1C LEVEL >9.0%: CPT | Performed by: PSYCHIATRY & NEUROLOGY

## 2017-05-08 PROCEDURE — 3017F COLORECTAL CA SCREEN DOC REV: CPT | Performed by: PSYCHIATRY & NEUROLOGY

## 2017-05-08 PROCEDURE — G8598 ASA/ANTIPLAT THER USED: HCPCS | Performed by: PSYCHIATRY & NEUROLOGY

## 2017-05-08 PROCEDURE — G8417 CALC BMI ABV UP PARAM F/U: HCPCS | Performed by: PSYCHIATRY & NEUROLOGY

## 2017-05-08 PROCEDURE — 1036F TOBACCO NON-USER: CPT | Performed by: PSYCHIATRY & NEUROLOGY

## 2017-05-08 PROCEDURE — G8427 DOCREV CUR MEDS BY ELIG CLIN: HCPCS | Performed by: PSYCHIATRY & NEUROLOGY

## 2017-05-08 RX ORDER — CYCLOBENZAPRINE HCL 10 MG
10 TABLET ORAL 3 TIMES DAILY PRN
COMMUNITY

## 2017-05-08 RX ORDER — MONTELUKAST SODIUM 10 MG/1
10 TABLET ORAL NIGHTLY
COMMUNITY

## 2017-05-08 RX ORDER — BUMETANIDE 2 MG/1
2 TABLET ORAL DAILY
COMMUNITY

## 2017-05-08 RX ORDER — CALCIUM CARBONATE 500(1250)
500 TABLET ORAL DAILY
COMMUNITY
End: 2017-09-21

## 2017-05-11 ENCOUNTER — TELEPHONE (OUTPATIENT)
Dept: NEUROLOGY | Age: 57
End: 2017-05-11

## 2017-05-12 ENCOUNTER — HOSPITAL ENCOUNTER (OUTPATIENT)
Dept: WOMENS IMAGING | Age: 57
Discharge: HOME | End: 2017-05-12
Payer: MEDICARE

## 2017-05-12 ENCOUNTER — OFFICE VISIT (OUTPATIENT)
Dept: SURGERY | Age: 57
End: 2017-05-12
Payer: MEDICARE

## 2017-05-12 VITALS
HEIGHT: 66 IN | BODY MASS INDEX: 44.84 KG/M2 | HEART RATE: 80 BPM | WEIGHT: 279 LBS | RESPIRATION RATE: 18 BRPM | SYSTOLIC BLOOD PRESSURE: 140 MMHG | DIASTOLIC BLOOD PRESSURE: 82 MMHG

## 2017-05-12 DIAGNOSIS — R92.8 ABNORMAL MAMMOGRAM: ICD-10-CM

## 2017-05-12 DIAGNOSIS — N63.0 LUMP OR MASS IN BREAST: ICD-10-CM

## 2017-05-12 DIAGNOSIS — N60.19 DIFFUSE CYSTIC MASTOPATHY, UNSPECIFIED LATERALITY: ICD-10-CM

## 2017-05-12 DIAGNOSIS — N62 MACROMASTIA: ICD-10-CM

## 2017-05-12 DIAGNOSIS — N63.0 LUMP OR MASS IN BREAST: Primary | ICD-10-CM

## 2017-05-12 PROCEDURE — 1036F TOBACCO NON-USER: CPT | Performed by: SURGERY

## 2017-05-12 PROCEDURE — G8417 CALC BMI ABV UP PARAM F/U: HCPCS | Performed by: SURGERY

## 2017-05-12 PROCEDURE — 3017F COLORECTAL CA SCREEN DOC REV: CPT | Performed by: SURGERY

## 2017-05-12 PROCEDURE — 99213 OFFICE O/P EST LOW 20 MIN: CPT | Performed by: SURGERY

## 2017-05-12 PROCEDURE — G8427 DOCREV CUR MEDS BY ELIG CLIN: HCPCS | Performed by: SURGERY

## 2017-05-12 PROCEDURE — 76642 ULTRASOUND BREAST LIMITED: CPT

## 2017-05-12 PROCEDURE — 3014F SCREEN MAMMO DOC REV: CPT | Performed by: SURGERY

## 2017-05-12 PROCEDURE — G8598 ASA/ANTIPLAT THER USED: HCPCS | Performed by: SURGERY

## 2017-05-12 RX ORDER — PERPHENAZINE/AMITRIPTYLINE HCL 4MG-10MG
1000 TABLET ORAL 2 TIMES DAILY
COMMUNITY
End: 2017-09-21

## 2017-05-17 ENCOUNTER — HOSPITAL ENCOUNTER (OUTPATIENT)
Dept: WOMENS IMAGING | Age: 57
Discharge: HOME | End: 2017-05-17
Payer: MEDICARE

## 2017-05-17 DIAGNOSIS — N63.0 LUMP OR MASS IN BREAST: ICD-10-CM

## 2017-05-17 PROCEDURE — G0206 DX MAMMO INCL CAD UNI: HCPCS

## 2017-05-17 PROCEDURE — 88305 TISSUE EXAM BY PATHOLOGIST: CPT

## 2017-05-17 PROCEDURE — 19083 BX BREAST 1ST LESION US IMAG: CPT

## 2017-05-22 ENCOUNTER — TELEPHONE (OUTPATIENT)
Dept: SURGERY | Age: 57
End: 2017-05-22

## 2017-05-26 ENCOUNTER — HOSPITAL ENCOUNTER (OUTPATIENT)
Dept: VASCULAR LAB | Age: 57
Discharge: HOME | End: 2017-05-26
Payer: MEDICARE

## 2017-05-26 DIAGNOSIS — G45.1 HEMISPHERIC CAROTID ARTERY SYNDROME: ICD-10-CM

## 2017-05-26 PROCEDURE — 93880 EXTRACRANIAL BILAT STUDY: CPT

## 2017-05-30 ENCOUNTER — TELEPHONE (OUTPATIENT)
Dept: NEUROLOGY | Age: 57
End: 2017-05-30

## 2017-06-05 ENCOUNTER — OFFICE VISIT (OUTPATIENT)
Dept: SURGERY | Age: 57
End: 2017-06-05
Payer: MEDICARE

## 2017-06-05 VITALS — DIASTOLIC BLOOD PRESSURE: 60 MMHG | HEART RATE: 76 BPM | SYSTOLIC BLOOD PRESSURE: 110 MMHG

## 2017-06-05 DIAGNOSIS — N60.11 FIBROCYSTIC DISEASE OF RIGHT BREAST: Primary | ICD-10-CM

## 2017-06-05 PROCEDURE — 99212 OFFICE O/P EST SF 10 MIN: CPT | Performed by: PHYSICIAN ASSISTANT

## 2017-06-05 PROCEDURE — 1036F TOBACCO NON-USER: CPT | Performed by: PHYSICIAN ASSISTANT

## 2017-06-05 PROCEDURE — G8417 CALC BMI ABV UP PARAM F/U: HCPCS | Performed by: PHYSICIAN ASSISTANT

## 2017-06-05 PROCEDURE — 3017F COLORECTAL CA SCREEN DOC REV: CPT | Performed by: PHYSICIAN ASSISTANT

## 2017-06-05 PROCEDURE — G8598 ASA/ANTIPLAT THER USED: HCPCS | Performed by: PHYSICIAN ASSISTANT

## 2017-06-05 PROCEDURE — 3014F SCREEN MAMMO DOC REV: CPT | Performed by: PHYSICIAN ASSISTANT

## 2017-06-05 PROCEDURE — G8428 CUR MEDS NOT DOCUMENT: HCPCS | Performed by: PHYSICIAN ASSISTANT

## 2017-06-15 ENCOUNTER — OFFICE VISIT (OUTPATIENT)
Dept: NEUROLOGY | Age: 57
End: 2017-06-15
Payer: MEDICARE

## 2017-06-15 VITALS
HEIGHT: 66 IN | BODY MASS INDEX: 44.52 KG/M2 | HEART RATE: 68 BPM | WEIGHT: 277 LBS | RESPIRATION RATE: 18 BRPM | DIASTOLIC BLOOD PRESSURE: 64 MMHG | SYSTOLIC BLOOD PRESSURE: 102 MMHG

## 2017-06-15 DIAGNOSIS — G25.81 RESTLESS LEGS SYNDROME: ICD-10-CM

## 2017-06-15 DIAGNOSIS — E11.42 DIABETIC POLYNEUROPATHY ASSOCIATED WITH TYPE 2 DIABETES MELLITUS (HCC): Primary | ICD-10-CM

## 2017-06-15 DIAGNOSIS — G43.119 INTRACTABLE MIGRAINE WITH AURA WITHOUT STATUS MIGRAINOSUS: ICD-10-CM

## 2017-06-15 DIAGNOSIS — G47.33 SLEEP APNEA, OBSTRUCTIVE: ICD-10-CM

## 2017-06-15 DIAGNOSIS — R29.810 FACIAL WEAKNESS: ICD-10-CM

## 2017-06-15 PROCEDURE — 1036F TOBACCO NON-USER: CPT | Performed by: PSYCHIATRY & NEUROLOGY

## 2017-06-15 PROCEDURE — 3017F COLORECTAL CA SCREEN DOC REV: CPT | Performed by: PSYCHIATRY & NEUROLOGY

## 2017-06-15 PROCEDURE — 3046F HEMOGLOBIN A1C LEVEL >9.0%: CPT | Performed by: PSYCHIATRY & NEUROLOGY

## 2017-06-15 PROCEDURE — G8427 DOCREV CUR MEDS BY ELIG CLIN: HCPCS | Performed by: PSYCHIATRY & NEUROLOGY

## 2017-06-15 PROCEDURE — 99214 OFFICE O/P EST MOD 30 MIN: CPT | Performed by: PSYCHIATRY & NEUROLOGY

## 2017-06-15 PROCEDURE — G8417 CALC BMI ABV UP PARAM F/U: HCPCS | Performed by: PSYCHIATRY & NEUROLOGY

## 2017-06-15 PROCEDURE — 3014F SCREEN MAMMO DOC REV: CPT | Performed by: PSYCHIATRY & NEUROLOGY

## 2017-06-15 PROCEDURE — G8598 ASA/ANTIPLAT THER USED: HCPCS | Performed by: PSYCHIATRY & NEUROLOGY

## 2017-06-15 RX ORDER — PREGABALIN 50 MG/1
50 CAPSULE ORAL 2 TIMES DAILY
Qty: 60 CAPSULE | Refills: 5 | Status: SHIPPED | OUTPATIENT
Start: 2017-06-15 | End: 2017-07-15

## 2017-06-23 ENCOUNTER — TELEPHONE (OUTPATIENT)
Dept: NEUROLOGY | Age: 57
End: 2017-06-23

## 2017-06-30 ENCOUNTER — HOSPITAL ENCOUNTER (OUTPATIENT)
Dept: MRI IMAGING | Age: 57
Discharge: HOME | End: 2017-06-30
Payer: MEDICARE

## 2017-06-30 DIAGNOSIS — G43.119 INTRACTABLE MIGRAINE WITH AURA WITHOUT STATUS MIGRAINOSUS: ICD-10-CM

## 2017-06-30 DIAGNOSIS — R29.810 FACIAL WEAKNESS: ICD-10-CM

## 2017-06-30 PROCEDURE — 70544 MR ANGIOGRAPHY HEAD W/O DYE: CPT

## 2017-07-07 ENCOUNTER — TELEPHONE (OUTPATIENT)
Dept: NEUROLOGY | Age: 57
End: 2017-07-07

## 2017-09-21 ENCOUNTER — OFFICE VISIT (OUTPATIENT)
Dept: NEUROLOGY | Age: 57
End: 2017-09-21
Payer: MEDICARE

## 2017-09-21 VITALS
SYSTOLIC BLOOD PRESSURE: 133 MMHG | OXYGEN SATURATION: 95 % | DIASTOLIC BLOOD PRESSURE: 81 MMHG | BODY MASS INDEX: 44.79 KG/M2 | WEIGHT: 277.5 LBS | HEART RATE: 67 BPM

## 2017-09-21 DIAGNOSIS — E11.42 DIABETIC POLYNEUROPATHY ASSOCIATED WITH TYPE 2 DIABETES MELLITUS (HCC): ICD-10-CM

## 2017-09-21 DIAGNOSIS — G47.33 SLEEP APNEA, OBSTRUCTIVE: Primary | ICD-10-CM

## 2017-09-21 DIAGNOSIS — G25.81 RESTLESS LEGS SYNDROME: ICD-10-CM

## 2017-09-21 DIAGNOSIS — G43.119 INTRACTABLE MIGRAINE WITH AURA WITHOUT STATUS MIGRAINOSUS: ICD-10-CM

## 2017-09-21 DIAGNOSIS — R29.810 FACIAL WEAKNESS: ICD-10-CM

## 2017-09-21 PROCEDURE — 3017F COLORECTAL CA SCREEN DOC REV: CPT | Performed by: PSYCHIATRY & NEUROLOGY

## 2017-09-21 PROCEDURE — G8598 ASA/ANTIPLAT THER USED: HCPCS | Performed by: PSYCHIATRY & NEUROLOGY

## 2017-09-21 PROCEDURE — 3046F HEMOGLOBIN A1C LEVEL >9.0%: CPT | Performed by: PSYCHIATRY & NEUROLOGY

## 2017-09-21 PROCEDURE — 99213 OFFICE O/P EST LOW 20 MIN: CPT | Performed by: PSYCHIATRY & NEUROLOGY

## 2017-09-21 PROCEDURE — 3014F SCREEN MAMMO DOC REV: CPT | Performed by: PSYCHIATRY & NEUROLOGY

## 2017-09-21 PROCEDURE — 1036F TOBACCO NON-USER: CPT | Performed by: PSYCHIATRY & NEUROLOGY

## 2017-09-21 PROCEDURE — G8427 DOCREV CUR MEDS BY ELIG CLIN: HCPCS | Performed by: PSYCHIATRY & NEUROLOGY

## 2017-09-21 PROCEDURE — G8417 CALC BMI ABV UP PARAM F/U: HCPCS | Performed by: PSYCHIATRY & NEUROLOGY

## 2017-09-28 ENCOUNTER — APPOINTMENT (OUTPATIENT)
Dept: LAB | Facility: HOSPITAL | Age: 57
End: 2017-09-28

## 2017-09-28 ENCOUNTER — TRANSCRIBE ORDERS (OUTPATIENT)
Dept: ADMINISTRATIVE | Facility: HOSPITAL | Age: 57
End: 2017-09-28

## 2017-09-28 DIAGNOSIS — J30.1 ALLERGIC RHINITIS DUE TO POLLEN, UNSPECIFIED RHINITIS SEASONALITY: Primary | ICD-10-CM

## 2017-09-28 DIAGNOSIS — J30.89 OTHER ALLERGIC RHINITIS: ICD-10-CM

## 2017-09-28 DIAGNOSIS — J45.30 MILD PERSISTENT ASTHMA, UNCOMPLICATED: ICD-10-CM

## 2017-09-28 PROCEDURE — 36415 COLL VENOUS BLD VENIPUNCTURE: CPT | Performed by: NURSE PRACTITIONER

## 2017-09-28 PROCEDURE — 86003 ALLG SPEC IGE CRUDE XTRC EA: CPT | Performed by: NURSE PRACTITIONER

## 2017-10-03 ENCOUNTER — TELEPHONE (OUTPATIENT)
Dept: SURGERY | Age: 57
End: 2017-10-03

## 2017-10-03 LAB
COW MILK IGE QN: <0.1 KU/L
WHEAT IGE QN: <0.1 KU/L

## 2017-10-13 ENCOUNTER — HOSPITAL ENCOUNTER (OUTPATIENT)
Dept: WOMENS IMAGING | Age: 57
Discharge: HOME | End: 2017-10-13
Payer: MEDICARE

## 2017-10-13 ENCOUNTER — OFFICE VISIT (OUTPATIENT)
Dept: SURGERY | Age: 57
End: 2017-10-13
Payer: MEDICARE

## 2017-10-13 VITALS
RESPIRATION RATE: 20 BRPM | DIASTOLIC BLOOD PRESSURE: 68 MMHG | HEART RATE: 80 BPM | HEIGHT: 66 IN | SYSTOLIC BLOOD PRESSURE: 122 MMHG | WEIGHT: 279.2 LBS | BODY MASS INDEX: 44.87 KG/M2

## 2017-10-13 DIAGNOSIS — N63.0 LUMP OR MASS IN BREAST: Primary | ICD-10-CM

## 2017-10-13 DIAGNOSIS — N62 MACROMASTIA: ICD-10-CM

## 2017-10-13 DIAGNOSIS — N60.19 DIFFUSE CYSTIC MASTOPATHY, UNSPECIFIED LATERALITY: ICD-10-CM

## 2017-10-13 DIAGNOSIS — Z12.31 VISIT FOR SCREENING MAMMOGRAM: ICD-10-CM

## 2017-10-13 PROCEDURE — 77063 BREAST TOMOSYNTHESIS BI: CPT

## 2017-10-13 PROCEDURE — 3014F SCREEN MAMMO DOC REV: CPT | Performed by: SURGERY

## 2017-10-13 PROCEDURE — G8484 FLU IMMUNIZE NO ADMIN: HCPCS | Performed by: SURGERY

## 2017-10-13 PROCEDURE — G8598 ASA/ANTIPLAT THER USED: HCPCS | Performed by: SURGERY

## 2017-10-13 PROCEDURE — G8417 CALC BMI ABV UP PARAM F/U: HCPCS | Performed by: SURGERY

## 2017-10-13 PROCEDURE — 99214 OFFICE O/P EST MOD 30 MIN: CPT | Performed by: SURGERY

## 2017-10-13 PROCEDURE — G8427 DOCREV CUR MEDS BY ELIG CLIN: HCPCS | Performed by: SURGERY

## 2017-10-13 PROCEDURE — 3017F COLORECTAL CA SCREEN DOC REV: CPT | Performed by: SURGERY

## 2017-10-13 PROCEDURE — 1036F TOBACCO NON-USER: CPT | Performed by: SURGERY

## 2017-10-18 ENCOUNTER — TELEPHONE (OUTPATIENT)
Dept: SURGERY | Age: 57
End: 2017-10-18

## 2017-10-19 ENCOUNTER — TELEPHONE (OUTPATIENT)
Dept: SURGERY | Age: 57
End: 2017-10-19

## 2017-10-19 NOTE — TELEPHONE ENCOUNTER
Spoke to Mrs. Arabella Villeda and she was just wondering if Dr. Jessica Duron has talked to Dr. Kelley Lou about her radiation treatments as a younger kid,etc. I explained to her that Dr. Jessica Duron had called Dr. Kelley Lou but they were not able to speak that day but I would ask Dr. Jessica Duron next week and let her know once I find out. I explained to her that Dr. Jessica Duron was out of the office until next Wednesday and she voiced her understanding.

## 2017-10-19 NOTE — TELEPHONE ENCOUNTER
Dr. Henry Shone called me and stated that he had talked to Dr. Don Lassiter about the patient. Dr. Henry Shone stated that / Don Lassiter said since her radiation was over 30 years ( it's been over 61 years per Dr. Henry Shone) that she was back to baseline risk for breast cancer. He stated for the patient not to worry about it and he would see her back in 6 months. I left voicemail for patient to call us back if she had any further questions.

## 2017-11-07 ENCOUNTER — TELEPHONE (OUTPATIENT)
Dept: WOMENS IMAGING | Age: 57
End: 2017-11-07

## 2017-11-08 ENCOUNTER — HOSPITAL ENCOUNTER (OUTPATIENT)
Dept: WOMENS IMAGING | Age: 57
Discharge: HOME | End: 2017-11-08
Payer: MEDICARE

## 2017-11-08 DIAGNOSIS — N64.89 BREAST ASYMMETRY: ICD-10-CM

## 2017-11-08 PROCEDURE — 76642 ULTRASOUND BREAST LIMITED: CPT

## 2018-03-22 ENCOUNTER — OFFICE VISIT (OUTPATIENT)
Dept: NEUROLOGY | Age: 58
End: 2018-03-22
Payer: MEDICARE

## 2018-03-22 VITALS
HEIGHT: 66 IN | BODY MASS INDEX: 45.29 KG/M2 | RESPIRATION RATE: 16 BRPM | DIASTOLIC BLOOD PRESSURE: 82 MMHG | WEIGHT: 281.8 LBS | HEART RATE: 56 BPM | SYSTOLIC BLOOD PRESSURE: 126 MMHG

## 2018-03-22 DIAGNOSIS — G47.33 SLEEP APNEA, OBSTRUCTIVE: Primary | ICD-10-CM

## 2018-03-22 DIAGNOSIS — G25.81 RESTLESS LEGS SYNDROME: ICD-10-CM

## 2018-03-22 DIAGNOSIS — E11.42 DIABETIC POLYNEUROPATHY ASSOCIATED WITH TYPE 2 DIABETES MELLITUS (HCC): ICD-10-CM

## 2018-03-22 DIAGNOSIS — G43.119 INTRACTABLE MIGRAINE WITH AURA WITHOUT STATUS MIGRAINOSUS: ICD-10-CM

## 2018-03-22 PROCEDURE — 99213 OFFICE O/P EST LOW 20 MIN: CPT | Performed by: PSYCHIATRY & NEUROLOGY

## 2018-03-22 PROCEDURE — G8598 ASA/ANTIPLAT THER USED: HCPCS | Performed by: PSYCHIATRY & NEUROLOGY

## 2018-03-22 PROCEDURE — G8484 FLU IMMUNIZE NO ADMIN: HCPCS | Performed by: PSYCHIATRY & NEUROLOGY

## 2018-03-22 PROCEDURE — 1036F TOBACCO NON-USER: CPT | Performed by: PSYCHIATRY & NEUROLOGY

## 2018-03-22 PROCEDURE — 3014F SCREEN MAMMO DOC REV: CPT | Performed by: PSYCHIATRY & NEUROLOGY

## 2018-03-22 PROCEDURE — G8427 DOCREV CUR MEDS BY ELIG CLIN: HCPCS | Performed by: PSYCHIATRY & NEUROLOGY

## 2018-03-22 PROCEDURE — G8417 CALC BMI ABV UP PARAM F/U: HCPCS | Performed by: PSYCHIATRY & NEUROLOGY

## 2018-03-22 PROCEDURE — 3017F COLORECTAL CA SCREEN DOC REV: CPT | Performed by: PSYCHIATRY & NEUROLOGY

## 2018-03-22 PROCEDURE — 3046F HEMOGLOBIN A1C LEVEL >9.0%: CPT | Performed by: PSYCHIATRY & NEUROLOGY

## 2018-03-22 NOTE — PROGRESS NOTES
[id number] [facility] Neurology Follow Up Encounter  3/22/2018 10:03 AM    Information:   Patient Name: Khari De Leon  :   1960  Age:   61 y.o. MRN:   [id number]  Account #:  [de-identified]  Today:  3/22/18    Provider: Farhan Zapata M.D. Chief Complaint:   Chief Complaint   Patient presents with    Eye Problem     6 month follow up patient stated that her right eye has been teitching. Subjective: Khari De Leon is a 61 y.o. woman  with a history of CHEMA, RLS, migraines, PN, and episodes of facial weakness who is following up. She was having chest pain and had a negative heart evaluation including a stress test.  She saw a cardiologist at [facility]. She had a migraine a couple weeks ago but that is her only in a long time. She uses her CPAP every night and rests well. Her numbness in her feet is the same. She has no numbness in her feet. She has had no facial weakness. She blames it on Lipitor.       Objective:     Past Medical History:  Past Medical History:   Diagnosis Date    Asthma     Cerebral concussion     Chest pain     Degenerative disc disease     Degenerative lumbar disc     Diabetes mellitus ([address]     Diabetic polyneuropathy associated with type 2 diabetes mellitus ([address]     Excessive cobalt intake     cobalt treatment for enlarged thymus     Hyperlipidemia     Knee cartilage, torn, right     Large thymus ([address]     Mitral regurgitation     trace    Restless leg syndrome     Sleep apnea, obstructive        Past Surgical History:   Procedure Laterality Date    BREAST SURGERY Left     Benign, Dr. Des Rick Right     [address]  2010    EF over 60% Normal left vertricular function and hemodynamics , Essentially normal coronary arteries by coronary arteriography    CARDIAC CATHETERIZATION  12    EF 60%    CARDIAC CATHETERIZATION      CARDIAC CATHETERIZATION  2016    Imp: Normal left

## 2018-04-11 ENCOUNTER — OFFICE VISIT (OUTPATIENT)
Dept: SURGERY | Age: 58
End: 2018-04-11
Payer: MEDICARE

## 2018-04-11 VITALS
HEIGHT: 66 IN | WEIGHT: 278 LBS | SYSTOLIC BLOOD PRESSURE: 124 MMHG | BODY MASS INDEX: 44.68 KG/M2 | DIASTOLIC BLOOD PRESSURE: 70 MMHG

## 2018-04-11 DIAGNOSIS — R92.8 ABNORMAL MAMMOGRAM: ICD-10-CM

## 2018-04-11 DIAGNOSIS — N63.0 LUMP OR MASS IN BREAST: ICD-10-CM

## 2018-04-11 DIAGNOSIS — N60.19 FIBROCYSTIC BREAST DISEASE (FCBD), UNSPECIFIED LATERALITY: Primary | ICD-10-CM

## 2018-04-11 PROCEDURE — G8598 ASA/ANTIPLAT THER USED: HCPCS | Performed by: SURGERY

## 2018-04-11 PROCEDURE — 3017F COLORECTAL CA SCREEN DOC REV: CPT | Performed by: SURGERY

## 2018-04-11 PROCEDURE — 1036F TOBACCO NON-USER: CPT | Performed by: SURGERY

## 2018-04-11 PROCEDURE — 3014F SCREEN MAMMO DOC REV: CPT | Performed by: SURGERY

## 2018-04-11 PROCEDURE — 99213 OFFICE O/P EST LOW 20 MIN: CPT | Performed by: SURGERY

## 2018-04-11 PROCEDURE — G8427 DOCREV CUR MEDS BY ELIG CLIN: HCPCS | Performed by: SURGERY

## 2018-04-11 PROCEDURE — G8417 CALC BMI ABV UP PARAM F/U: HCPCS | Performed by: SURGERY

## 2018-04-13 ENCOUNTER — OFFICE VISIT (OUTPATIENT)
Dept: SURGERY | Facility: CLINIC | Age: 58
End: 2018-04-13

## 2018-04-13 VITALS
DIASTOLIC BLOOD PRESSURE: 84 MMHG | SYSTOLIC BLOOD PRESSURE: 128 MMHG | BODY MASS INDEX: 44 KG/M2 | HEIGHT: 66 IN | WEIGHT: 273.8 LBS

## 2018-04-13 DIAGNOSIS — K83.8 ACQUIRED DILATION OF COMMON BILE DUCT: Primary | ICD-10-CM

## 2018-04-13 PROCEDURE — 99213 OFFICE O/P EST LOW 20 MIN: CPT | Performed by: SURGERY

## 2018-04-13 RX ORDER — POTASSIUM CHLORIDE 20 MEQ/1
1 TABLET, EXTENDED RELEASE ORAL DAILY
COMMUNITY

## 2018-04-13 RX ORDER — BUMETANIDE 2 MG/1
2 TABLET ORAL AS NEEDED
COMMUNITY

## 2018-04-13 RX ORDER — LANCETS
EACH MISCELLANEOUS
COMMUNITY
Start: 2017-11-16 | End: 2020-02-19 | Stop reason: ALTCHOICE

## 2018-04-19 ENCOUNTER — HOSPITAL ENCOUNTER (OUTPATIENT)
Dept: MRI IMAGING | Facility: HOSPITAL | Age: 58
Discharge: HOME | End: 2018-04-19
Admitting: SURGERY

## 2018-04-19 DIAGNOSIS — K83.8 ACQUIRED DILATION OF COMMON BILE DUCT: ICD-10-CM

## 2018-04-19 PROCEDURE — 25010000002 GADOTERIDOL PER 1 ML: Performed by: SURGERY

## 2018-04-19 PROCEDURE — A9576 INJ PROHANCE MULTIPACK: HCPCS | Performed by: SURGERY

## 2018-04-19 PROCEDURE — 74183 MRI ABD W/O CNTR FLWD CNTR: CPT

## 2018-04-19 RX ADMIN — GADOTERIDOL 20 ML: 279.3 INJECTION, SOLUTION INTRAVENOUS at 09:10

## 2018-04-21 NOTE — PROGRESS NOTES
Chief Complaint   Patient presents with   • Abdominal Pain     Possible common bile duct stone.   • Nausea        HPI  Hx of remote cholecystectomy.Pain in the RUQ is noted. No nausea, vomiting, weight loss, fever, chills.  US has demonstrated a dilated CBD. No hx of jaundice  Past Medical History:   Diagnosis Date   • Allergic rhinitis    • Arthritis    • Chronic rhinitis    • Deviated nasal septum    • Diabetes    • Diverticulosis of colon    • Dizziness    • GERD (gastroesophageal reflux disease)    • High cholesterol    • Hypertrophy of nasal turbinates    • Obstructive sleep apnea    • Peptic ulcer    • Polyposis of colon    • Sensorineural hearing loss    • Tinnitus        Past Surgical History:   Procedure Laterality Date   • APPENDECTOMY     • CARDIAC CATHETERIZATION     • CHOLECYSTECTOMY     • COLON SURGERY     • COLONOSCOPY     • ENDOSCOPY     • HYSTERECTOMY     • TURBINOPLASTY           Current Outpatient Prescriptions:   •  aspirin 81 MG chewable tablet, Chew 81 mg Daily., Disp: , Rfl:   •  KRIS BREEZE 2 TEST disk, USE TO CHECK BLOOD SUGAR EVERY DAY, Disp: , Rfl: 11  •  bumetanide (BUMEX) 2 MG tablet, Take 2 mg by mouth As Needed., Disp: , Rfl:   •  calcium carbonate EX (TUMS EX) 750 MG chewable tablet, Chew 750 mg Daily., Disp: , Rfl:   •  Cholecalciferol (VITAMIN D PO), Take 1 tablet by mouth Daily., Disp: , Rfl:   •  diphenhydrAMINE (BENADRYL) 25 MG tablet, Every 6 (Six) Hours., Disp: , Rfl:   •  meclizine (ANTIVERT) 12.5 MG tablet, Take 12.5 mg by mouth 3 (Three) Times a Day As Needed for dizziness., Disp: , Rfl:   •  MICROLET LANCETS misc, USE TO TEST DAILY, Disp: , Rfl:   •  montelukast (SINGULAIR) 10 MG tablet, Take 10 mg by mouth Every Night., Disp: , Rfl:   •  Multiple Vitamins-Minerals (MULTIVITAL PO), Take  by mouth., Disp: , Rfl:   •  Naproxen Sodium 220 MG capsule, Take by mouth, Disp: , Rfl:   •  ondansetron ODT (ZOFRAN-ODT) 8 MG disintegrating tablet, Every 8 (Eight) Hours., Disp: , Rfl:    •  pantoprazole (PROTONIX) 40 MG EC tablet, Take 40 mg by mouth daily.  , Disp: , Rfl:   •  potassium chloride (K-DUR,KLOR-CON) 20 MEQ CR tablet, Take 1 tablet by mouth Daily., Disp: , Rfl:   •  albuterol (PROVENTIL HFA;VENTOLIN HFA) 108 (90 BASE) MCG/ACT inhaler, Inhale 2 puffs Every 4 (Four) Hours As Needed for Wheezing., Disp: , Rfl:   •  dextromethorphan-guaifenesin (MUCINEX DM)  MG per 12 hr tablet, Take 1 tablet by mouth As Needed., Disp: , Rfl:   •  EPINEPHrine (EPIPEN JR) 0.15 MG/0.3ML solution auto-injector injection, Inject as directed, Disp: , Rfl:   •  simvastatin (ZOCOR) 40 MG tablet, Take 40 mg by mouth nightly.  , Disp: , Rfl:     Allergies   Allergen Reactions   • Advair Diskus [Fluticasone-Salmeterol] Shortness Of Breath   • Albuterol Shortness Of Breath   • Albuterol Sulfate Shortness Of Breath     Throat closes.   • Beclomethasone Shortness Of Breath   • Lasix [Furosemide] Shortness Of Breath   • Levaquin [Levofloxacin] Shortness Of Breath     Chest pain.   • Lyrica [Pregabalin] Shortness Of Breath   • Nasonex [Mometasone Furoate] Shortness Of Breath   • Spiriva Handihaler [Tiotropium Bromide Monohydrate] Shortness Of Breath   • Sulfa Antibiotics Shortness Of Breath     Mouth swelling.   • Symbicort [Budesonide-Formoterol Fumarate] Shortness Of Breath   • Topamax [Topiramate] Shortness Of Breath   • Zithromax [Azithromycin] Shortness Of Breath   • Aspergillus (Mixed) Allergy Skin Test Dermatitis   • Ampicillin Other (See Comments)     Patient unsure of reaction.   • Cymbalta [Duloxetine Hcl] Mental Status Change   • Dicyclomine Other (See Comments)     Depression.   • Simvastatin Other (See Comments)     Mouth drawing, slurred speech, headaches.   • Medrol [Methylprednisolone] Rash       Family History   Problem Relation Age of Onset   • Cancer Other    • Heart disease Other    • Hypertension Other    • Heart disease Mother    • Stroke Mother    • Heart disease Father        Social History      Social History   • Marital status:      Spouse name: N/A   • Number of children: N/A   • Years of education: N/A     Occupational History   • Not on file.     Social History Main Topics   • Smoking status: Never Smoker   • Smokeless tobacco: Never Used   • Alcohol use Yes      Comment: socially   • Drug use: Unknown   • Sexual activity: Not on file     Other Topics Concern   • Not on file     Social History Narrative   • No narrative on file       Review of Systems   All other systems reviewed and are negative.      Physical Exam   Constitutional: She is oriented to person, place, and time. She appears well-developed and well-nourished. No distress.   HENT:   Head: Normocephalic and atraumatic.   Eyes: EOM are normal. Pupils are equal, round, and reactive to light. No scleral icterus.   Neck: Normal range of motion. Neck supple. No JVD present. No tracheal deviation present. No thyromegaly present.   Cardiovascular: Normal rate and regular rhythm.    Pulmonary/Chest: Effort normal and breath sounds normal. No stridor.   Abdominal: Soft. Bowel sounds are normal. She exhibits no distension.   Lymphadenopathy:     She has no cervical adenopathy.   Neurological: She is alert and oriented to person, place, and time.   Skin: She is not diaphoretic.         ASSESSMENT    Alexandria was seen today for abdominal pain and nausea.    Diagnoses and all orders for this visit:    Acquired dilation of common bile duct  -     Cancel: MRI abdomen w contrast mrcp; Future  -     MRI abdomen w wo contrast mrcp; Future        PLAN    1. Recheck after above          This document has been electronically signed by Tawanda Barger MD on April 21, 2018 4:17 PM

## 2018-04-24 DIAGNOSIS — Z12.31 VISIT FOR SCREENING MAMMOGRAM: Primary | ICD-10-CM

## 2018-04-30 ENCOUNTER — OFFICE VISIT (OUTPATIENT)
Dept: SURGERY | Facility: CLINIC | Age: 58
End: 2018-04-30

## 2018-04-30 VITALS
SYSTOLIC BLOOD PRESSURE: 108 MMHG | WEIGHT: 273 LBS | BODY MASS INDEX: 43.87 KG/M2 | HEIGHT: 66 IN | DIASTOLIC BLOOD PRESSURE: 70 MMHG

## 2018-04-30 DIAGNOSIS — K83.8 ACQUIRED DILATION OF COMMON BILE DUCT: Primary | ICD-10-CM

## 2018-04-30 PROCEDURE — 99213 OFFICE O/P EST LOW 20 MIN: CPT | Performed by: SURGERY

## 2018-04-30 NOTE — PATIENT INSTRUCTIONS

## 2018-04-30 NOTE — PROGRESS NOTES
Chief Complaint   Patient presents with   • Follow-up     [facility] MRI abdomen results.        HPI  Please see my last summary.  Since last visit, symptoms have significantly improved.  She has less nausea vomiting and less abdominal pain.  Her appetite remains good.  He has had no jaundice.  Most recent MRI demonstrates the following:    Study Result     MRI abdomen with and without contrast. MRCP.     HISTORY: Right-sided abdominal pain.  Dilated common bile duct.     Prior examination: None.     TECHNIQUE: Multiplanar multisequence images of the abdomen  obtained both prior to and following the intravenous infusion of  20 mL of ProHance. Computer generated 3-D images including  rotational and subtraction images are obtained for MRCP.     FINDINGS:     Normal liver. No masses or intrahepatic biliary dilatation.     The gallbladder is not visualized, presumably surgically absent.  Normal  caliber patent portal vein. Normal spleen. Normal kidneys  and adrenal glands. Normal pancreas.     Normal kidneys. No right peroneal adenopathy. MRCP images  demonstrates a normal intrahepatic biliary radicles. 1.28 cm  dilated common bile duct without evidence of any intraductal  calculi. Normal pancreatic duct.     IMPRESSION:  CONCLUSION: The gallbladder is nonvisualized, presumably  surgically absent.     Dilated common bile duct 1.28 cm. No intraductal calculi. Normal  intrahepatic biliary radicles. Normal liver. Normal pancreatic  duct. MRI abdomen with and without contrast, MRCP images are  otherwise unremarkable.     Electronically signed by:  Jona Trivedi MD  4/20/2018 11:39 AM CDT  Workstation: [id number]     I have personally reviewed the imaging and concur with the radiologist's findings.    Past Medical History:   Diagnosis Date   • Allergic rhinitis    • Arthritis    • Chronic rhinitis    • Deviated nasal septum    • Diabetes    • Diverticulosis of colon    • Dizziness    • GERD (gastroesophageal reflux disease)    •  High cholesterol    • Hypertrophy of nasal turbinates    • Obstructive sleep apnea    • Peptic ulcer    • Polyposis of colon    • Sensorineural hearing loss    • Tinnitus        Past Surgical History:   Procedure Laterality Date   • APPENDECTOMY     • CARDIAC CATHETERIZATION     • CHOLECYSTECTOMY     • COLON SURGERY     • COLONOSCOPY     • ENDOSCOPY     • HYSTERECTOMY     • TURBINOPLASTY           Current Outpatient Prescriptions:   •  aspirin 81 MG chewable tablet, Chew 81 mg Daily., Disp: , Rfl:   •  KRIS BREEZE 2 TEST disk, USE TO CHECK BLOOD SUGAR EVERY DAY, Disp: , Rfl: 11  •  bumetanide (BUMEX) 2 MG tablet, Take 2 mg by mouth As Needed., Disp: , Rfl:   •  calcium carbonate EX (TUMS EX) 750 MG chewable tablet, Chew 750 mg Daily., Disp: , Rfl:   •  Cholecalciferol (VITAMIN D PO), Take 1 tablet by mouth Daily., Disp: , Rfl:   •  dextromethorphan-guaifenesin (MUCINEX DM)  MG per 12 hr tablet, Take 1 tablet by mouth As Needed., Disp: , Rfl:   •  diphenhydrAMINE (BENADRYL) 25 MG tablet, Every 6 (Six) Hours., Disp: , Rfl:   •  EPINEPHrine (EPIPEN JR) 0.15 MG/0.3ML solution auto-injector injection, Inject as directed, Disp: , Rfl:   •  meclizine (ANTIVERT) 12.5 MG tablet, Take 12.5 mg by mouth 3 (Three) Times a Day As Needed for dizziness., Disp: , Rfl:   •  MICROLET LANCETS misc, USE TO TEST DAILY, Disp: , Rfl:   •  montelukast (SINGULAIR) 10 MG tablet, Take 10 mg by mouth Every Night., Disp: , Rfl:   •  Multiple Vitamins-Minerals (MULTIVITAL PO), Take  by mouth., Disp: , Rfl:   •  Naproxen Sodium 220 MG capsule, Take by mouth, Disp: , Rfl:   •  ondansetron ODT (ZOFRAN-ODT) 8 MG disintegrating tablet, Every 8 (Eight) Hours., Disp: , Rfl:   •  pantoprazole (PROTONIX) 40 MG EC tablet, Take 40 mg by mouth daily.  , Disp: , Rfl:   •  potassium chloride (K-DUR,KLOR-CON) 20 MEQ CR tablet, Take 1 tablet by mouth Daily., Disp: , Rfl:   •  simvastatin (ZOCOR) 40 MG tablet, Take 40 mg by mouth nightly.  , Disp: , Rfl:    •  albuterol (PROVENTIL HFA;VENTOLIN HFA) 108 (90 BASE) MCG/ACT inhaler, Inhale 2 puffs Every 4 (Four) Hours As Needed for Wheezing., Disp: , Rfl:     Allergies   Allergen Reactions   • Advair Diskus [Fluticasone-Salmeterol] Shortness Of Breath   • Albuterol Shortness Of Breath   • Albuterol Sulfate Shortness Of Breath     Throat closes.   • Beclomethasone Shortness Of Breath   • Lasix [Furosemide] Shortness Of Breath   • Levaquin [Levofloxacin] Shortness Of Breath     Chest pain.   • Lyrica [Pregabalin] Shortness Of Breath   • Nasonex [Mometasone Furoate] Shortness Of Breath   • Spiriva Handihaler [Tiotropium Bromide Monohydrate] Shortness Of Breath   • Sulfa Antibiotics Shortness Of Breath     Mouth swelling.   • Symbicort [Budesonide-Formoterol Fumarate] Shortness Of Breath   • Topamax [Topiramate] Shortness Of Breath   • Zithromax [Azithromycin] Shortness Of Breath   • Aspergillus (Mixed) Allergy Skin Test Dermatitis   • Ampicillin Other (See Comments)     Patient unsure of reaction.   • Cymbalta [Duloxetine Hcl] Mental Status Change   • Dicyclomine Other (See Comments)     Depression.   • Simvastatin Other (See Comments)     Mouth drawing, slurred speech, headaches.   • Medrol [Methylprednisolone] Rash       Family History   Problem Relation Age of Onset   • Cancer Other    • Heart disease Other    • Hypertension Other    • Heart disease Mother    • Stroke Mother    • Heart disease Father        Social History     Social History   • Marital status:      Spouse name: N/A   • Number of children: N/A   • Years of education: N/A     Occupational History   • Not on file.     Social History Main Topics   • Smoking status: Never Smoker   • Smokeless tobacco: Never Used   • Alcohol use Yes      Comment: socially   • Drug use: Unknown   • Sexual activity: Not on file     Other Topics Concern   • Not on file     Social History Narrative   • No narrative on file       Review of Systems   Gastrointestinal:  Positive for abdominal pain and nausea.   All other systems reviewed and are negative.      Physical Exam   Constitutional: She appears well-developed and well-nourished. No distress.   HENT:   Head: Normocephalic and atraumatic.   Eyes: EOM are normal. Pupils are equal, round, and reactive to light. No scleral icterus.   Neck: Normal range of motion. Neck supple. No JVD present. No tracheal deviation present. No thyromegaly present.   Pulmonary/Chest: Effort normal and breath sounds normal. No stridor.   Abdominal: Soft. Bowel sounds are normal. She exhibits no distension and no mass. There is no tenderness. There is no rebound and no guarding. No hernia.       Lymphadenopathy:     She has no cervical adenopathy.   Skin: She is not diaphoretic.   Vitals reviewed.        ASSESSMENT    Alexandria was seen today for follow-up.    Diagnoses and all orders for this visit:    Acquired dilation of common bile duct  -     US Liver; Future        PLAN    1.  I discussed the findings with patient.  At this time, I feel of the proper course would be to repeat the ultrasound in 4 months.  If she has worsening symptoms or if the bile duct continues to enlarge, she may require operative exploration of her biliary tree          This document has been electronically signed by Tawanda Barger MD on April 30, 2018 12:18 PM

## 2018-06-22 ENCOUNTER — TRANSCRIBE ORDERS (OUTPATIENT)
Dept: ORTHOPEDIC SURGERY | Facility: CLINIC | Age: 58
End: 2018-06-22

## 2018-06-22 DIAGNOSIS — M25.532 LEFT WRIST PAIN: Primary | ICD-10-CM

## 2018-07-03 ENCOUNTER — OFFICE VISIT (OUTPATIENT)
Dept: ORTHOPEDIC SURGERY | Facility: CLINIC | Age: 58
End: 2018-07-03

## 2018-07-03 VITALS — BODY MASS INDEX: 43.55 KG/M2 | WEIGHT: 271 LBS | HEIGHT: 66 IN

## 2018-07-03 DIAGNOSIS — M25.532 LEFT WRIST PAIN: ICD-10-CM

## 2018-07-03 DIAGNOSIS — M19.032 PRIMARY OSTEOARTHRITIS OF LEFT WRIST: Primary | ICD-10-CM

## 2018-07-03 PROCEDURE — 99214 OFFICE O/P EST MOD 30 MIN: CPT | Performed by: NURSE PRACTITIONER

## 2018-07-03 RX ORDER — PROMETHAZINE HYDROCHLORIDE 25 MG/1
25 TABLET ORAL EVERY 6 HOURS PRN
COMMUNITY

## 2018-07-03 NOTE — PROGRESS NOTES
Alexandria Rizo is a 57 y.o. female   Primary provider:  Gerardo Arrington MD       Chief Complaint   Patient presents with   • Left Wrist - Pain       HISTORY OF PRESENT ILLNESS:    57-year-old  female in the office today for an evaluation of her left wrist pain which started after she felt a pop in her wrist while she was moving it proximally 2 weeks ago.  She reports worsening of pain was evaluated by her primary care provider who placed her in a wrist splint and on anti-inflammatories.  She reports there is some improvement in her symptoms since the last evaluation with her primary care provider    Pain   This is a new problem. The current episode started 1 to 4 weeks ago. The problem occurs intermittently. Associated symptoms include abdominal pain, fatigue, headaches, joint swelling and nausea. Associated symptoms comments: Aching, burning, clicking. She has tried acetaminophen and NSAIDs for the symptoms.        CONCURRENT MEDICAL HISTORY:    Past Medical History:   Diagnosis Date   • Allergic rhinitis    • Arthritis    • Asthma    • Chronic rhinitis    • Depression    • Deviated nasal septum    • Diabetes (CMS/HCC)    • Diverticulosis of colon    • Dizziness    • GERD (gastroesophageal reflux disease)    • High cholesterol    • History of stomach ulcers    • Hypertrophy of nasal turbinates    • Obstructive sleep apnea    • Osteoporosis    • Peptic ulcer    • Polyposis of colon    • Sensorineural hearing loss    • Tinnitus        Allergies   Allergen Reactions   • Advair Diskus [Fluticasone-Salmeterol] Shortness Of Breath   • Albuterol Shortness Of Breath   • Albuterol Sulfate Shortness Of Breath     Throat closes.   • Beclomethasone Shortness Of Breath   • Lasix [Furosemide] Shortness Of Breath   • Levaquin [Levofloxacin] Shortness Of Breath     Chest pain.   • Lyrica [Pregabalin] Shortness Of Breath   • Nasonex [Mometasone Furoate] Shortness Of Breath   • Simvastatin Other (See Comments)     Mouth  drawing, slurred speech, headaches.   • Spiriva Handihaler [Tiotropium Bromide Monohydrate] Shortness Of Breath   • Sulfa Antibiotics Shortness Of Breath     Mouth swelling.   • Symbicort [Budesonide-Formoterol Fumarate] Shortness Of Breath   • Topamax [Topiramate] Shortness Of Breath   • Zithromax [Azithromycin] Shortness Of Breath   • Aspergillus (Mixed) Allergy Skin Test Dermatitis   • Ampicillin Other (See Comments)     Patient unsure of reaction.   • Cymbalta [Duloxetine Hcl] Mental Status Change   • Dicyclomine Other (See Comments)     Depression.   • Medrol [Methylprednisolone] Rash         Current Outpatient Prescriptions:   •  albuterol (PROVENTIL HFA;VENTOLIN HFA) 108 (90 BASE) MCG/ACT inhaler, Inhale 2 puffs Every 4 (Four) Hours As Needed for Wheezing., Disp: , Rfl:   •  aspirin 81 MG chewable tablet, Chew 81 mg Daily., Disp: , Rfl:   •  KRIS BREEZE 2 TEST disk, USE TO CHECK BLOOD SUGAR EVERY DAY, Disp: , Rfl: 11  •  bumetanide (BUMEX) 2 MG tablet, Take 2 mg by mouth As Needed., Disp: , Rfl:   •  calcium carbonate EX (TUMS EX) 750 MG chewable tablet, Chew 750 mg Daily., Disp: , Rfl:   •  Cholecalciferol (VITAMIN D PO), Take 1 tablet by mouth Daily., Disp: , Rfl:   •  dextromethorphan-guaifenesin (MUCINEX DM)  MG per 12 hr tablet, Take 1 tablet by mouth As Needed., Disp: , Rfl:   •  diclofenac (VOLTAREN) 1 % gel gel, Apply 4 g topically 4 (Four) Times a Day As Needed., Disp: , Rfl:   •  diphenhydrAMINE (BENADRYL) 25 MG tablet, Every 6 (Six) Hours., Disp: , Rfl:   •  EPINEPHrine (EPIPEN JR) 0.15 MG/0.3ML solution auto-injector injection, Inject as directed, Disp: , Rfl:   •  meclizine (ANTIVERT) 12.5 MG tablet, Take 12.5 mg by mouth 3 (Three) Times a Day As Needed for dizziness., Disp: , Rfl:   •  MICROLET LANCETS misc, USE TO TEST DAILY, Disp: , Rfl:   •  montelukast (SINGULAIR) 10 MG tablet, Take 10 mg by mouth Every Night., Disp: , Rfl:   •  Multiple Vitamins-Minerals (MULTIVITAL PO), Take  by  mouth., Disp: , Rfl:   •  Naproxen Sodium 220 MG capsule, Take by mouth, Disp: , Rfl:   •  ondansetron ODT (ZOFRAN-ODT) 8 MG disintegrating tablet, Every 8 (Eight) Hours., Disp: , Rfl:   •  pantoprazole (PROTONIX) 40 MG EC tablet, Take 40 mg by mouth daily.  , Disp: , Rfl:   •  potassium chloride (K-DUR,KLOR-CON) 20 MEQ CR tablet, Take 1 tablet by mouth Daily., Disp: , Rfl:   •  promethazine (PHENERGAN) 25 MG tablet, Take 25 mg by mouth Every 6 (Six) Hours As Needed for Nausea or Vomiting., Disp: , Rfl:     Past Surgical History:   Procedure Laterality Date   • APPENDECTOMY     • BREAST BIOPSY  1996   • BREAST BIOPSY Right 06/04/2012   • CARDIAC CATHETERIZATION     • CARPAL TUNNEL RELEASE Bilateral 02/2004   • CAUTERIZATION NASAL BLEEDERS  01/2016   • CHOLECYSTECTOMY     • COLON SURGERY     • COLONOSCOPY     • DILATATION AND CURETTAGE  1986   • ENDOSCOPY     • HERNIA REPAIR  2006   • HYSTERECTOMY     • NASAL RECONSTRUCTION  2016   • SHOULDER SURGERY Right 12/2003    arthroscopy   • TONSILLECTOMY  1968   • TURBINOPLASTY         Family History   Problem Relation Age of Onset   • Cancer Other    • Heart disease Other    • Hypertension Other    • Heart disease Mother    • Stroke Mother    • Asthma Mother    • Heart disease Father    • Cancer Father 81        prostate   • Heart attack Sister    • Cirrhosis Brother 56        liver   • Stroke Maternal Grandmother 48   • Heart disease Maternal Grandfather 68   • Stroke Paternal Grandmother 78   • Heart disease Paternal Grandfather 65        Social History     Social History   • Marital status:      Spouse name: N/A   • Number of children: N/A   • Years of education: N/A     Occupational History   • Not on file.     Social History Main Topics   • Smoking status: Never Smoker   • Smokeless tobacco: Never Used   • Alcohol use Yes      Comment: socially   • Drug use: Unknown   • Sexual activity: Not on file     Other Topics Concern   • Not on file     Social History  "Narrative   • No narrative on file        Review of Systems   Constitutional: Positive for fatigue.   HENT: Positive for tinnitus.    Gastrointestinal: Positive for abdominal pain and nausea.   Musculoskeletal: Positive for joint swelling.   Neurological: Positive for headaches.   All other systems reviewed and are negative.      PHYSICAL EXAMINATION:       Ht 167.6 cm (66\")   Wt 123 kg (271 lb)   BMI 43.74 kg/m²     Physical Exam   Constitutional: She is oriented to person, place, and time. Vital signs are normal. She appears well-developed and well-nourished. She is cooperative.   HENT:   Head: Normocephalic and atraumatic.   Neck: Trachea normal and phonation normal.   Pulmonary/Chest: Effort normal. No respiratory distress.   Abdominal: Soft. Normal appearance. She exhibits no distension.   Neurological: She is alert and oriented to person, place, and time. GCS eye subscore is 4. GCS verbal subscore is 5. GCS motor subscore is 6.   Skin: Skin is warm, dry and intact.   Psychiatric: She has a normal mood and affect. Her speech is normal and behavior is normal. Judgment and thought content normal. Cognition and memory are normal.   Vitals reviewed.      GAIT:     [x]  Normal  []  Antalgic    Assistive device: [x]  None  []  Walker     []  Crutches  []  Cane     []  Wheelchair  []  Stretcher    Right Hand Exam   Right hand exam is normal.      Left Hand Exam     Tenderness   The patient is experiencing tenderness in the ulnar area and radial area.     Range of Motion     Wrist   Extension: abnormal   Flexion: abnormal   Pronation: abnormal   Supination: abnormal     Muscle Strength   Wrist Extension: 4/5   Wrist Flexion: 4/5   :  4/5     Other   Erythema: absent  Scars: absent  Sensation: normal  Pulse: present              No results found.        ASSESSMENT:    Diagnoses and all orders for this visit:    Primary osteoarthritis of left wrist    Left wrist pain    Other orders  -     diclofenac (VOLTAREN) 1 % " gel gel; Apply 4 g topically 4 (Four) Times a Day As Needed.  -     promethazine (PHENERGAN) 25 MG tablet; Take 25 mg by mouth Every 6 (Six) Hours As Needed for Nausea or Vomiting.          PLAN  Reviewed outside x-rays from the primary care office and recommended continued use of the wrist controlled splint anti-inflammatories and follow-up in 4-6 weeks for recheck.  Patient does not want a referral to hand surgeon at this time for further evaluation.  No Follow-up on file.    Lamine Pettit, APRN

## 2018-08-21 ENCOUNTER — TELEPHONE (OUTPATIENT)
Dept: SURGERY | Age: 58
End: 2018-08-21

## 2018-08-30 ENCOUNTER — HOSPITAL ENCOUNTER (OUTPATIENT)
Dept: ULTRASOUND IMAGING | Facility: HOSPITAL | Age: 58
Discharge: HOME | End: 2018-08-30
Admitting: SURGERY

## 2018-08-30 DIAGNOSIS — K83.8 ACQUIRED DILATION OF COMMON BILE DUCT: ICD-10-CM

## 2018-08-30 PROCEDURE — 76705 ECHO EXAM OF ABDOMEN: CPT

## 2018-09-05 ENCOUNTER — OFFICE VISIT (OUTPATIENT)
Dept: SURGERY | Facility: CLINIC | Age: 58
End: 2018-09-05

## 2018-09-05 VITALS
HEIGHT: 66 IN | SYSTOLIC BLOOD PRESSURE: 126 MMHG | WEIGHT: 277 LBS | TEMPERATURE: 98 F | HEART RATE: 66 BPM | BODY MASS INDEX: 44.52 KG/M2 | DIASTOLIC BLOOD PRESSURE: 84 MMHG

## 2018-09-05 DIAGNOSIS — Z87.898 HISTORY OF ABDOMINAL PAIN: Primary | ICD-10-CM

## 2018-09-05 PROCEDURE — 99212 OFFICE O/P EST SF 10 MIN: CPT | Performed by: SURGERY

## 2018-09-05 RX ORDER — MECLIZINE HYDROCHLORIDE 25 MG/1
1 TABLET ORAL AS NEEDED
COMMUNITY
Start: 2018-08-03

## 2018-09-05 RX ORDER — AMOXICILLIN 500 MG/1
1 CAPSULE ORAL 2 TIMES DAILY
COMMUNITY
Start: 2018-09-04 | End: 2019-06-17

## 2018-09-05 RX ORDER — FLUTICASONE PROPIONATE 50 MCG
SPRAY, SUSPENSION (ML) NASAL AS NEEDED
COMMUNITY
Start: 2018-08-03 | End: 2019-06-17

## 2018-09-05 NOTE — PROGRESS NOTES
Chief Complaint   Patient presents with   • Follow-up     Recheck abdominal Ultra Sound results.        HPI  Study Result     Ultrasound liver, right upper quadrant.     HISTORY: Dilated common bile duct.      Prior exam: MRI abdomen April 19, 2018.     FINDINGS: Normal liver. No masses or intrahepatic biliary  dilatation. The gallbladder is surgically absent.     Dilated common bile duct 1.33 cm. This is similar to images from  MRI study April 19, 2018.     Pancreas is obscured by overlying bowel gas.     Normal right kidney 10.45 x 5.2 84.55 cm.     IMPRESSION:  CONCLUSION: Normal liver. No masses or intrahepatic biliary  dilatation. Gallbladder surgically absent. Dilated common bile  duct 1.3 cm similar to images from MRI abdomen April 19, 2018.  Pancreas obscured by overlying bowel gas. Normal right kidney.     Electronically signed by:  Jona Trivedi MD  8/30/2018 2:40 PM CDT  Workstation: [id number]       Past Medical History:   Diagnosis Date   • Allergic rhinitis    • Arthritis    • Asthma    • Chronic rhinitis    • Depression    • Deviated nasal septum    • Diabetes (CMS/HCC)    • Diverticulosis of colon    • Dizziness    • GERD (gastroesophageal reflux disease)    • High cholesterol    • History of stomach ulcers    • Hypertrophy of nasal turbinates    • Obstructive sleep apnea    • Osteoporosis    • Peptic ulcer    • Polyposis of colon    • Sensorineural hearing loss    • Tinnitus        Past Surgical History:   Procedure Laterality Date   • APPENDECTOMY     • BREAST BIOPSY  1996   • BREAST BIOPSY Right 06/04/2012   • CARDIAC CATHETERIZATION     • CARPAL TUNNEL RELEASE Bilateral 02/2004   • CAUTERIZATION NASAL BLEEDERS  01/2016   • CHOLECYSTECTOMY     • COLON SURGERY     • COLONOSCOPY     • DILATATION AND CURETTAGE  1986   • ENDOSCOPY     • HERNIA REPAIR  2006   • HYSTERECTOMY     • NASAL RECONSTRUCTION  2016   • SHOULDER SURGERY Right 12/2003    arthroscopy   • TONSILLECTOMY  1968   • TURBINOPLASTY            Current Outpatient Prescriptions:   •  albuterol (PROVENTIL HFA;VENTOLIN HFA) 108 (90 BASE) MCG/ACT inhaler, Inhale 2 puffs Every 4 (Four) Hours As Needed for Wheezing., Disp: , Rfl:   •  amoxicillin (AMOXIL) 500 MG capsule, Take 1 capsule by mouth 2 (Two) Times a Day., Disp: , Rfl:   •  aspirin 81 MG chewable tablet, Chew 81 mg Daily., Disp: , Rfl:   •  KRIS BREEZE 2 TEST disk, USE TO CHECK BLOOD SUGAR EVERY DAY, Disp: , Rfl: 11  •  bumetanide (BUMEX) 2 MG tablet, Take 2 mg by mouth As Needed., Disp: , Rfl:   •  calcium carbonate EX (TUMS EX) 750 MG chewable tablet, Chew 750 mg Daily., Disp: , Rfl:   •  Cholecalciferol (VITAMIN D PO), Take 1 tablet by mouth Daily., Disp: , Rfl:   •  dextromethorphan-guaifenesin (MUCINEX DM)  MG per 12 hr tablet, Take 1 tablet by mouth As Needed., Disp: , Rfl:   •  diclofenac (VOLTAREN) 1 % gel gel, Apply 4 g topically 4 (Four) Times a Day As Needed., Disp: , Rfl:   •  diphenhydrAMINE (BENADRYL) 25 MG tablet, Every 6 (Six) Hours., Disp: , Rfl:   •  fluticasone (FLONASE) 50 MCG/ACT nasal spray, As Needed., Disp: , Rfl:   •  meclizine (ANTIVERT) 25 MG tablet, Take 1 tablet by mouth As Needed., Disp: , Rfl:   •  MICROLET LANCETS misc, USE TO TEST DAILY, Disp: , Rfl:   •  montelukast (SINGULAIR) 10 MG tablet, Take 10 mg by mouth Every Night., Disp: , Rfl:   •  Multiple Vitamins-Minerals (MULTIVITAL PO), Take  by mouth., Disp: , Rfl:   •  Naproxen Sodium 220 MG capsule, Take by mouth, Disp: , Rfl:   •  ondansetron ODT (ZOFRAN-ODT) 8 MG disintegrating tablet, Every 8 (Eight) Hours., Disp: , Rfl:   •  pantoprazole (PROTONIX) 40 MG EC tablet, Take 40 mg by mouth daily.  , Disp: , Rfl:   •  potassium chloride (K-DUR,KLOR-CON) 20 MEQ CR tablet, Take 1 tablet by mouth Daily., Disp: , Rfl:   •  promethazine (PHENERGAN) 25 MG tablet, Take 25 mg by mouth Every 6 (Six) Hours As Needed for Nausea or Vomiting., Disp: , Rfl:   •  EPINEPHrine (EPIPEN JR) 0.15 MG/0.3ML solution  auto-injector injection, Inject as directed, Disp: , Rfl:   •  meclizine (ANTIVERT) 12.5 MG tablet, Take 12.5 mg by mouth 3 (Three) Times a Day As Needed for dizziness., Disp: , Rfl:     Allergies   Allergen Reactions   • Advair Diskus [Fluticasone-Salmeterol] Shortness Of Breath   • Albuterol Shortness Of Breath   • Albuterol Sulfate Shortness Of Breath     Throat closes.   • Beclomethasone Shortness Of Breath   • Lasix [Furosemide] Shortness Of Breath   • Levaquin [Levofloxacin] Shortness Of Breath     Chest pain.   • Lyrica [Pregabalin] Shortness Of Breath   • Nasonex [Mometasone Furoate] Shortness Of Breath   • Simvastatin Other (See Comments)     Mouth drawing, slurred speech, headaches.   • Spiriva Handihaler [Tiotropium Bromide Monohydrate] Shortness Of Breath   • Sulfa Antibiotics Shortness Of Breath     Mouth swelling.   • Symbicort [Budesonide-Formoterol Fumarate] Shortness Of Breath   • Topamax [Topiramate] Shortness Of Breath   • Zithromax [Azithromycin] Shortness Of Breath   • Aspergillus (Mixed) Allergy Skin Test Dermatitis   • Ampicillin Other (See Comments)     Patient unsure of reaction.   • Cymbalta [Duloxetine Hcl] Mental Status Change   • Dicyclomine Other (See Comments)     Depression.   • Medrol [Methylprednisolone] Rash       Family History   Problem Relation Age of Onset   • Cancer Other    • Heart disease Other    • Hypertension Other    • Heart disease Mother    • Stroke Mother    • Asthma Mother    • Heart disease Father    • Cancer Father 81        prostate   • Heart attack Sister    • Cirrhosis Brother 56        liver   • Stroke Maternal Grandmother 48   • Heart disease Maternal Grandfather 68   • Stroke Paternal Grandmother 78   • Heart disease Paternal Grandfather 65       Social History     Social History   • Marital status:      Spouse name: N/A   • Number of children: N/A   • Years of education: N/A     Occupational History   • Not on file.     Social History Main Topics   •  Smoking status: Never Smoker   • Smokeless tobacco: Never Used   • Alcohol use Yes      Comment: socially   • Drug use: Unknown   • Sexual activity: Not on file     Other Topics Concern   • Not on file     Social History Narrative   • No narrative on file       Review of Systems   Gastrointestinal: Negative for abdominal distention, abdominal pain, anal bleeding, blood in stool, constipation, diarrhea, nausea, rectal pain and vomiting.       Physical Exam   Abdominal: Soft. Bowel sounds are normal. She exhibits no distension and no mass. There is no tenderness. There is no rebound and no guarding. No hernia.         ASSESSMENT    Alexandria was seen today for follow-up.    Diagnoses and all orders for this visit:    History of abdominal pain  Comments:  no evidence of biliary tract or liver abnormality by current examination        PLAN    1.  Recheck with me as needed              This document has been electronically signed by Tawanda Barger MD on September 8, 2018 12:39 PM

## 2018-09-05 NOTE — PATIENT INSTRUCTIONS

## 2018-09-08 PROBLEM — Z87.898 HISTORY OF ABDOMINAL PAIN: Status: ACTIVE | Noted: 2018-09-08

## 2018-10-15 ENCOUNTER — HOSPITAL ENCOUNTER (OUTPATIENT)
Dept: WOMENS IMAGING | Age: 58
Discharge: HOME | End: 2018-10-15
Payer: MEDICARE

## 2018-10-15 ENCOUNTER — OFFICE VISIT (OUTPATIENT)
Dept: SURGERY | Age: 58
End: 2018-10-15
Payer: MEDICARE

## 2018-10-15 VITALS — DIASTOLIC BLOOD PRESSURE: 70 MMHG | SYSTOLIC BLOOD PRESSURE: 124 MMHG | HEART RATE: 80 BPM

## 2018-10-15 DIAGNOSIS — Z12.31 VISIT FOR SCREENING MAMMOGRAM: ICD-10-CM

## 2018-10-15 DIAGNOSIS — N60.19 FIBROCYSTIC BREAST DISEASE (FCBD), UNSPECIFIED LATERALITY: ICD-10-CM

## 2018-10-15 DIAGNOSIS — N63.0 LUMP OR MASS IN BREAST: ICD-10-CM

## 2018-10-15 DIAGNOSIS — N62 MACROMASTIA: Primary | ICD-10-CM

## 2018-10-15 PROCEDURE — G8427 DOCREV CUR MEDS BY ELIG CLIN: HCPCS | Performed by: SURGERY

## 2018-10-15 PROCEDURE — 77063 BREAST TOMOSYNTHESIS BI: CPT

## 2018-10-15 PROCEDURE — G8598 ASA/ANTIPLAT THER USED: HCPCS | Performed by: SURGERY

## 2018-10-15 PROCEDURE — G8484 FLU IMMUNIZE NO ADMIN: HCPCS | Performed by: SURGERY

## 2018-10-15 PROCEDURE — 99213 OFFICE O/P EST LOW 20 MIN: CPT | Performed by: SURGERY

## 2018-10-15 PROCEDURE — 3017F COLORECTAL CA SCREEN DOC REV: CPT | Performed by: SURGERY

## 2018-10-15 PROCEDURE — 1036F TOBACCO NON-USER: CPT | Performed by: SURGERY

## 2018-10-15 PROCEDURE — G8417 CALC BMI ABV UP PARAM F/U: HCPCS | Performed by: SURGERY

## 2018-10-15 NOTE — PROGRESS NOTES
HISTORY OF PRESENT ILLNESS:  Ms. Oumar Galindo is a 60 y.o. white female who presents today for her 6 month follow up breast check.       She has a family history of breast cancer in her paternal [de-identified]      She is post menopausal and is not on HRT.     She has had a previous breast biopsy which was benign. Her most recent biopsy of the right breast in May 2017 was benign.      Narrative   EXAMINATION: JIMMY DIGITAL SCREEN W OR WO CAD BILATERAL 10/15/2018 1:02   PM   HISTORY: 48-year-old female with a weak family history of breast   carcinoma. Report: Today's examination consists of standard craniocaudal and oblique   views of both breasts.  3D tomographic views are also obtained. Comparison is made with bilateral screening mammograms 10/13/2017 and   right-sided mammograms 5/17/2017. There are scattered parenchymal densities, Pattern B. A skin marker is   noted over the left upper breast and the patient has a history of   previous benign biopsy on the left. No dominant mass or parenchymal   distortion is identified. No suspicious microcalcifications, skin   thickening or nipple retraction is identified. There is no significant   change since the previous study.       Impression   Impression:    1. Stable mammograms, no suspicious features are identified. Annual   screening is recommended. 2. BI-RADS category 2, benign. I reviewed the radiographic images with the patient. I concur with the radiologist evaluation and recommendations. She has a recent history of an enlarged common bile duct found incidentally. She is many years status post cholecystectomy and the significant resistance is uncertain. BREAST EXAM:  Alexsandra Yepez has fibrocystic changes throughout both breasts. There are no dominant masses, no skin or nipple changes and no axillary adenopathy.  I see nothing suspicious on physical examination.       The area of interest feels like normal nodular breast tissue.      IMPRESSION:   Benign

## 2018-10-16 DIAGNOSIS — Z12.31 VISIT FOR SCREENING MAMMOGRAM: Primary | ICD-10-CM

## 2019-03-25 ENCOUNTER — OFFICE VISIT (OUTPATIENT)
Dept: NEUROLOGY | Age: 59
End: 2019-03-25
Payer: MEDICARE

## 2019-03-25 VITALS
WEIGHT: 287.6 LBS | HEIGHT: 66 IN | RESPIRATION RATE: 18 BRPM | DIASTOLIC BLOOD PRESSURE: 84 MMHG | BODY MASS INDEX: 46.22 KG/M2 | HEART RATE: 63 BPM | SYSTOLIC BLOOD PRESSURE: 122 MMHG

## 2019-03-25 DIAGNOSIS — G47.33 SLEEP APNEA, OBSTRUCTIVE: Primary | ICD-10-CM

## 2019-03-25 DIAGNOSIS — G25.81 RESTLESS LEGS SYNDROME: ICD-10-CM

## 2019-03-25 DIAGNOSIS — E11.42 DIABETIC POLYNEUROPATHY ASSOCIATED WITH TYPE 2 DIABETES MELLITUS (HCC): ICD-10-CM

## 2019-03-25 PROCEDURE — 1036F TOBACCO NON-USER: CPT | Performed by: PSYCHIATRY & NEUROLOGY

## 2019-03-25 PROCEDURE — G8417 CALC BMI ABV UP PARAM F/U: HCPCS | Performed by: PSYCHIATRY & NEUROLOGY

## 2019-03-25 PROCEDURE — 3017F COLORECTAL CA SCREEN DOC REV: CPT | Performed by: PSYCHIATRY & NEUROLOGY

## 2019-03-25 PROCEDURE — 2022F DILAT RTA XM EVC RTNOPTHY: CPT | Performed by: PSYCHIATRY & NEUROLOGY

## 2019-03-25 PROCEDURE — G8484 FLU IMMUNIZE NO ADMIN: HCPCS | Performed by: PSYCHIATRY & NEUROLOGY

## 2019-03-25 PROCEDURE — G8427 DOCREV CUR MEDS BY ELIG CLIN: HCPCS | Performed by: PSYCHIATRY & NEUROLOGY

## 2019-03-25 PROCEDURE — 3046F HEMOGLOBIN A1C LEVEL >9.0%: CPT | Performed by: PSYCHIATRY & NEUROLOGY

## 2019-03-25 PROCEDURE — G8598 ASA/ANTIPLAT THER USED: HCPCS | Performed by: PSYCHIATRY & NEUROLOGY

## 2019-03-25 PROCEDURE — 99213 OFFICE O/P EST LOW 20 MIN: CPT | Performed by: PSYCHIATRY & NEUROLOGY

## 2019-03-25 RX ORDER — POTASSIUM CHLORIDE 20 MEQ/1
1 TABLET, EXTENDED RELEASE ORAL
COMMUNITY
End: 2021-10-26

## 2019-03-25 RX ORDER — PROMETHAZINE HYDROCHLORIDE 25 MG/1
25 TABLET ORAL
COMMUNITY

## 2019-03-25 RX ORDER — COVID-19 ANTIGEN TEST
KIT MISCELLANEOUS
COMMUNITY

## 2019-03-25 RX ORDER — MECLIZINE HCL 12.5 MG/1
12.5 TABLET ORAL
COMMUNITY

## 2019-03-25 RX ORDER — ASPIRIN 81 MG/1
81 TABLET, CHEWABLE ORAL
COMMUNITY

## 2019-03-25 NOTE — PROGRESS NOTES
[facility]  [address] [place] [address] Drive, [address]  [facility], [facility] [address]  Phone [phone]  Fax [phone]     [facility] Follow Up Encounter  3/25/2019 9:44 AM    Information:   Patient Name: Efrem Bauer  :   1960  Age:   60 y.o. MRN:   [id number]  Account #:  [de-identified]  Today:  3/25/19    Provider: Magda Avila M.D. Chief Complaint:   Chief Complaint   Patient presents with    Follow-up    Sleep Apnea       Subjective: Efrem Bauer is a 60 y.o. woman with a history of CHEMA, diabetic PN, RLS, and spells with facial weakness who is following up. She complains of being tired all the time. She wants to sleep during the day. She wakens gasping. She had a re titration polysomnogram in [facility] the other night and ended up on BiPAP. She is scheduled to  that machine. She says her CPAP is only 3years old. She sleeps in a recliner. She has chronic numbness and burning in her feet for which she takes Lyrica and that helps. She has no numbness in her hands. She complains of back pain and bilateral sciatica. She says she has been waking with headaches for the past week. They do not last long. Her RLS is doing fairly well.         Objective:     Past Medical History:  Past Medical History:   Diagnosis Date    Asthma     Cerebral concussion     Chest pain     Degenerative disc disease     Degenerative lumbar disc     Diabetes mellitus ([address]     Diabetic polyneuropathy associated with type 2 diabetes mellitus (HCC)     Excessive cobalt intake     cobalt treatment for enlarged thymus     Hyperlipidemia     Knee cartilage, torn, right     Large thymus ([address]     Mitral regurgitation     trace    Restless leg syndrome     Sleep apnea, obstructive        Past Surgical History:   Procedure Laterality Date    BREAST SURGERY Left     Benign, Dr. Letha Frankel Right     [address] CATHETERIZATION  2010 EF over 60% Normal left vertricular function and hemodynamics , Essentially normal coronary arteries by coronary arteriography    CARDIAC CATHETERIZATION  4/24/12    EF 60%    CARDIAC CATHETERIZATION  2003    CARDIAC CATHETERIZATION  08/17/2016    Imp: Normal left ventricular systolic function and hemodynamics. Mild nonocclusive coronary artery disease with no significant lesions identified. Right dominant system. Nicki Tate M.D.   Sushila Ards      bilateral    CHOLECYSTECTOMY      DILATION AND CURETTAGE OF UTERUS      HERNIA REPAIR      HYSTERECTOMY      SMALL INTESTINE SURGERY      x2    TONSILLECTOMY      [address] EXTRACTION  1985    4 wisdom teeth extracted        Recent Hospitalizations  · None    Significant Injuries  · None    Habits  Mayo Evans reports that she has never smoked. She has never used smokeless tobacco. She reports that she drinks alcohol. She reports that she does not use drugs. Family History   Problem Relation Age of Onset    Coronary Art Dis Mother     Stroke Mother     Coronary Art Dis Father     Cancer Father         Prostate cancer    Coronary Art Dis Sister        Social History  Mayo Evans is , lives in [facility], and is disabled.     Medications:  Current Outpatient Medications   Medication Sig Dispense Refill    aspirin 81 MG chewable tablet Take 81 mg by mouth      diclofenac sodium 1 % GEL Apply 4 g topically      meclizine (ANTIVERT) 12.5 MG tablet Take 12.5 mg by mouth      Naproxen Sodium 220 MG CAPS Take by mouth      promethazine (PHENERGAN) 25 MG tablet Take 25 mg by mouth      potassium chloride (KLOR-CON M) 20 MEQ extended release tablet Take 1 tablet by mouth      montelukast (SINGULAIR) 10 MG tablet Take 10 mg by mouth nightly      bumetanide (BUMEX) 2 MG tablet Take 2 mg by mouth daily      albuterol sulfate (PROAIR RESPICLICK) 464 (90 BASE) MCG/ACT aerosol powder inhalation Inhale into the lungs      pathophysiology (namely the mechanism of breathing and obstruction of upper airway, interruptions of sleep, hypoxemia, hypercapnia, and results of repetitive sympathetic activation), risks, evaluation, and treatment options. I discussed the risks of driving when drowsy and advised that Greyson Auguste not drive when drowsy and avoid sedating medications and respiratory suppressants. Plan:   1. Continue PAP use nightly. Get PSG report. 2. Continue present medications.     3. FU here in a year    Electronically signed by Shaw Kumari MD on 3/25/2019

## 2019-06-13 ENCOUNTER — OFFICE VISIT (OUTPATIENT)
Dept: OTOLARYNGOLOGY | Facility: CLINIC | Age: 59
End: 2019-06-13

## 2019-06-13 ENCOUNTER — PROCEDURE VISIT (OUTPATIENT)
Dept: OTOLARYNGOLOGY | Facility: CLINIC | Age: 59
End: 2019-06-13

## 2019-06-13 VITALS
WEIGHT: 277 LBS | HEART RATE: 61 BPM | BODY MASS INDEX: 44.52 KG/M2 | HEIGHT: 66 IN | SYSTOLIC BLOOD PRESSURE: 138 MMHG | RESPIRATION RATE: 20 BRPM | TEMPERATURE: 98.7 F | DIASTOLIC BLOOD PRESSURE: 78 MMHG

## 2019-06-13 DIAGNOSIS — H90.3 SENSORINEURAL HEARING LOSS (SNHL) OF BOTH EARS: Primary | ICD-10-CM

## 2019-06-13 DIAGNOSIS — R42 VERTIGO: ICD-10-CM

## 2019-06-13 DIAGNOSIS — H90.3 SENSORINEURAL HEARING LOSS (SNHL) OF BOTH EARS: ICD-10-CM

## 2019-06-13 DIAGNOSIS — R42 DIZZINESS: ICD-10-CM

## 2019-06-13 DIAGNOSIS — R26.89 IMBALANCE: ICD-10-CM

## 2019-06-13 DIAGNOSIS — J30.9 ALLERGIC RHINITIS, UNSPECIFIED SEASONALITY, UNSPECIFIED TRIGGER: ICD-10-CM

## 2019-06-13 DIAGNOSIS — J32.9 CHRONIC SINUSITIS, UNSPECIFIED LOCATION: ICD-10-CM

## 2019-06-13 DIAGNOSIS — H93.13 TINNITUS OF BOTH EARS: Primary | ICD-10-CM

## 2019-06-13 DIAGNOSIS — H93.13 TINNITUS OF BOTH EARS: ICD-10-CM

## 2019-06-13 DIAGNOSIS — H69.83 DYSFUNCTION OF BOTH EUSTACHIAN TUBES: ICD-10-CM

## 2019-06-13 PROCEDURE — 92567 TYMPANOMETRY: CPT | Performed by: AUDIOLOGIST-HEARING AID FITTER

## 2019-06-13 PROCEDURE — 92557 COMPREHENSIVE HEARING TEST: CPT | Performed by: AUDIOLOGIST-HEARING AID FITTER

## 2019-06-13 PROCEDURE — 99214 OFFICE O/P EST MOD 30 MIN: CPT | Performed by: NURSE PRACTITIONER

## 2019-06-13 RX ORDER — LEVOCETIRIZINE DIHYDROCHLORIDE 5 MG/1
5 TABLET, FILM COATED ORAL EVERY EVENING
Qty: 30 TABLET | Refills: 3 | Status: SHIPPED | OUTPATIENT
Start: 2019-06-13 | End: 2019-07-13

## 2019-06-13 NOTE — PROGRESS NOTES
PRIMARY CARE PROVIDER: Gerardo Arrington MD  REFERRING PROVIDER: No ref. provider found    Chief Complaint   Patient presents with   • Follow-up     Vertigo       Subjective   History of Present Illness:  Alexandria Rizo is a  58 y.o. female who complains of otalgia, ear fullness, ear pressure, fluid on the ear, vertigo, decreased hearing and high pitched tinnitus. The symptoms are localized to both ears. The patient has had moderate symptoms. The symptoms have been intermittant for the last 2 years. The symptoms are aggravated by  sinonasal complaints and sinus infections.  She has tried Nasonex and Flonase in the past but states she cannot tolerate these because it causes difficulty breathing.  She reports allergic reactions to many medications including antibiotics. She is also tried antihistamines without improvement. She is hesitant to try medications because she is very sensitive to everything.  She has been allergy to tested in the past by Dr. Yang and reports a history of severe allergies.  She states she did try allergy shots.  She has a history of inferior turbinate reduction in January 2016.    She also reports a history of vertigo.  She states this is severe and she has associated nausea and vomiting.  She states this occurs with position change especially laying back.  She states the duration varies from minutes to days.  She states lying still and Antivert help.  She had an MRI of the brain in 2017 that was relatively unremarkable with the exception of possible mild sphenoid sinusitis.    Review of Systems:  Review of Systems   Constitutional: Negative for chills and fever.   HENT: Positive for congestion, ear pain, hearing loss, postnasal drip, rhinorrhea and tinnitus. Negative for ear discharge, sinus pressure, sinus pain and voice change.    Respiratory: Negative for cough.    Cardiovascular: Negative for chest pain.   Gastrointestinal: Negative for diarrhea, nausea and vomiting.    Musculoskeletal: Positive for arthralgias.   Allergic/Immunologic: Positive for environmental allergies.       Past History:  Past Medical History:   Diagnosis Date   • Allergic rhinitis    • Arthritis    • Asthma    • Chronic rhinitis    • Depression    • Deviated nasal septum    • Diabetes (CMS/HCC)    • Diverticulosis of colon    • Dizziness    • GERD (gastroesophageal reflux disease)    • High cholesterol    • History of stomach ulcers    • Hypertrophy of nasal turbinates    • Obstructive sleep apnea    • Osteoporosis    • Peptic ulcer    • Polyposis of colon    • Sensorineural hearing loss    • Tinnitus      Past Surgical History:   Procedure Laterality Date   • APPENDECTOMY     • BREAST BIOPSY  1996   • BREAST BIOPSY Right 06/04/2012   • CARDIAC CATHETERIZATION     • CARPAL TUNNEL RELEASE Bilateral 02/2004   • CAUTERIZATION NASAL BLEEDERS  01/2016   • CHOLECYSTECTOMY     • COLON SURGERY     • COLONOSCOPY     • DILATATION AND CURETTAGE  1986   • ENDOSCOPY     • HERNIA REPAIR  2006   • HYSTERECTOMY     • NASAL RECONSTRUCTION  2016   • SHOULDER SURGERY Right 12/2003    arthroscopy   • TONSILLECTOMY  1968   • TURBINOPLASTY       Family History   Problem Relation Age of Onset   • Cancer Other    • Heart disease Other    • Hypertension Other    • Heart disease Mother    • Stroke Mother    • Asthma Mother    • Heart disease Father    • Cancer Father 81        prostate   • Heart attack Sister    • Cirrhosis Brother 56        liver   • Stroke Maternal Grandmother 48   • Heart disease Maternal Grandfather 68   • Stroke Paternal Grandmother 78   • Heart disease Paternal Grandfather 65     Social History     Tobacco Use   • Smoking status: Never Smoker   • Smokeless tobacco: Never Used   Substance Use Topics   • Alcohol use: Yes     Comment: socially   • Drug use: Defer     Allergies:  Advair diskus [fluticasone-salmeterol]; Albuterol; Albuterol sulfate; Beclomethasone; Lasix [furosemide]; Levaquin [levofloxacin];  Lyrica [pregabalin]; Nasonex [mometasone furoate]; Simvastatin; Spiriva handihaler [tiotropium bromide monohydrate]; Sulfa antibiotics; Symbicort [budesonide-formoterol fumarate]; Topamax [topiramate]; Zithromax [azithromycin]; Aspergillus (mixed) allergy skin test; Ampicillin; Cymbalta [duloxetine hcl]; Dicyclomine; and Medrol [methylprednisolone]    Current Outpatient Medications:   •  albuterol (PROVENTIL HFA;VENTOLIN HFA) 108 (90 BASE) MCG/ACT inhaler, Inhale 2 puffs Every 4 (Four) Hours As Needed for Wheezing., Disp: , Rfl:   •  aspirin 81 MG chewable tablet, Chew 81 mg Daily., Disp: , Rfl:   •  KRIS BREEZE 2 TEST disk, USE TO CHECK BLOOD SUGAR EVERY DAY, Disp: , Rfl: 11  •  bumetanide (BUMEX) 2 MG tablet, Take 2 mg by mouth As Needed., Disp: , Rfl:   •  dextromethorphan-guaifenesin (MUCINEX DM)  MG per 12 hr tablet, Take 1 tablet by mouth As Needed., Disp: , Rfl:   •  diphenhydrAMINE (BENADRYL) 25 MG tablet, Every 6 (Six) Hours., Disp: , Rfl:   •  EPINEPHrine (EPIPEN JR) 0.15 MG/0.3ML solution auto-injector injection, Inject as directed, Disp: , Rfl:   •  meclizine (ANTIVERT) 25 MG tablet, Take 1 tablet by mouth As Needed., Disp: , Rfl:   •  MICROLET LANCETS misc, USE TO TEST DAILY, Disp: , Rfl:   •  montelukast (SINGULAIR) 10 MG tablet, Take 10 mg by mouth Every Night., Disp: , Rfl:   •  Naproxen Sodium 220 MG capsule, Take by mouth, Disp: , Rfl:   •  ondansetron ODT (ZOFRAN-ODT) 8 MG disintegrating tablet, Every 8 (Eight) Hours., Disp: , Rfl:   •  pantoprazole (PROTONIX) 40 MG EC tablet, Take 40 mg by mouth daily.  , Disp: , Rfl:   •  potassium chloride (K-DUR,KLOR-CON) 20 MEQ CR tablet, Take 1 tablet by mouth Daily., Disp: , Rfl:   •  promethazine (PHENERGAN) 25 MG tablet, Take 25 mg by mouth Every 6 (Six) Hours As Needed for Nausea or Vomiting., Disp: , Rfl:   •  levocetirizine (XYZAL) 5 MG tablet, Take 1 tablet by mouth Every Evening for 30 days. 1 by mouth every day as needed for allergy symptoms,  "Disp: 30 tablet, Rfl: 3      Objective     Vital Signs:    /78   Pulse 61   Temp 98.7 °F (37.1 °C)   Resp 20   Ht 167.6 cm (66\")   Wt 126 kg (277 lb)   BMI 44.71 kg/m²     Physical Exam:  Physical Exam  CONSTITUTIONAL: well nourished, well-developed, alert, oriented, in no acute distress   COMMUNICATION AND VOICE: able to communicate normally, normal voice quality  HEAD: normocephalic, no lesions, atraumatic, no tenderness, no masses   FACE: appearance normal, no lesions, no tenderness, no deformities, facial motion symmetric  SALIVARY GLANDS: parotid glands with no tenderness, no swelling, no masses, submandibular glands with normal size, nontender  EYES: ocular motility normal, eyelids normal, orbits normal, no proptosis, conjunctiva normal , pupils equal, round   EARS:  Hearing: response to conversational voice normal bilaterally   External Ears: auricles without lesions  Otoscopic: tympanic membrane appearance normal, no lesions, no perforation, normal mobility, no fluid  NOSE:  External Nose: structure normal, no tenderness on palpation, no nasal discharge, no lesions, no evidence of trauma, nostrils patent   Intranasal Exam: nasal mucosa inflammation, vestibule within normal limits, inferior turbinate normal  ORAL:  Lips: upper and lower lips without lesion   Teeth: dentition within normal limits for age   Gums: gingivae healthy   Oral Mucosa: oral mucosa normal, no mucosal lesions   Floor of Mouth: Warthin’s duct patent, mucosa normal  Tongue: lingual mucosa normal without lesions, normal tongue mobility   Palate: soft and hard palates with normal mucosa and structure  Oropharynx: oropharyngeal mucosa normal  NECK: neck appearance normal, no masses or tenderness  LYMPH NODES: no lymphadenopathy  CHEST/RESPIRATORY: respiratory effort normal, normal breath sounds   CARDIOVASCULAR: rate and rhythm normal, extremities without cyanosis or edema    NEUROLOGIC/PSYCHIATRIC: oriented to time, place and " person, mood normal, affect appropriate, CN II-XII intact grossly    [place]-Hallpike was negative    Results Review:       Assessment   Assessment:  1. Tinnitus of both ears    2. Sensorineural hearing loss (SNHL) of both ears     3. Vertigo    4. Dizziness    5. Imbalance    6. Allergic rhinitis, unspecified seasonality, unspecified trigger    7. Chronic sinusitis, unspecified location    8. Dysfunction of both eustachian tubes        Plan   Plan:    New Medications Ordered This Visit   Medications   • levocetirizine (XYZAL) 5 MG tablet     Sig: Take 1 tablet by mouth Every Evening for 30 days. 1 by mouth every day as needed for allergy symptoms     Dispense:  30 tablet     Refill:  3     She cannot tolerate nasal steroids.  She has not taken Xyzal and would like to try this. We have also discussed the possibility of allergy testing and CT scan of the sinuses.  She would like to hold off on this for now.    Audiogram was reviewed.  There is no evidence of middle ear effusion.  Tinnitus masking techniques were discussed.  Tinnitus handout was given.      [place]-Hallpike was negative. We will repeat MRI of the brain due to complaints of vertigo.    Return in about 4 weeks (around 7/11/2019), or if symptoms worsen or fail to improve, for Recheck.    My findings and recommendations were discussed and questions were answered.     JAMAL Ramsay

## 2019-06-17 ENCOUNTER — OFFICE VISIT (OUTPATIENT)
Dept: PULMONOLOGY | Facility: CLINIC | Age: 59
End: 2019-06-17

## 2019-06-17 VITALS
DIASTOLIC BLOOD PRESSURE: 90 MMHG | SYSTOLIC BLOOD PRESSURE: 140 MMHG | BODY MASS INDEX: 43.87 KG/M2 | HEIGHT: 66 IN | HEART RATE: 94 BPM | OXYGEN SATURATION: 99 % | WEIGHT: 273 LBS

## 2019-06-17 DIAGNOSIS — G47.33 OSA TREATED WITH BIPAP: ICD-10-CM

## 2019-06-17 DIAGNOSIS — J45.20 MILD INTERMITTENT ASTHMA WITHOUT COMPLICATION: Primary | ICD-10-CM

## 2019-06-17 DIAGNOSIS — E66.01 MORBID OBESITY (HCC): ICD-10-CM

## 2019-06-17 PROCEDURE — 99214 OFFICE O/P EST MOD 30 MIN: CPT | Performed by: INTERNAL MEDICINE

## 2019-06-17 NOTE — PATIENT INSTRUCTIONS
The patient has had to use a rescue inhaler more frequently recently and has had more problems with dyspnea.  I will set her up for a follow-up appointment in about 2 months with complete pulmonary functions with and without bronchodilators on her return visit.

## 2019-06-18 NOTE — PROGRESS NOTES
Subjective   Alexandria Rizo is a 58 y.o. female.     Chief Complaint   Patient presents with   • Shortness of Breath      No My Sticky Note on file.    History of Present Illness   The patient had more problems with dyspnea recently has had to use her rescue inhaler bit more frequently.  She is not been able to tolerate any sort of maintenance therapy.  I did advise her that we will set her up for a follow-up complete pulmonary function study with and without bronchodilators in about 2 months.  I told her we can schedule that sooner if she would like that she is only going out of town the next few weeks.  I told her if she returns and has more positive dyspnea contact the office will have these done at the hospital and see her back sooner.  I suspect a lot of her problems are due to her weight and deconditioning although some of this could relate to her asthma as well.  She does have obstructive sleep apnea for which she is on BiPAP and is doing fairly well with this at present.    Medical/Family/Social History   has a past medical history of Allergic rhinitis, Arthritis, Asthma, Chronic rhinitis, Depression, Deviated nasal septum, Diabetes (CMS/HCC), Diverticulosis of colon, Dizziness, GERD (gastroesophageal reflux disease), High cholesterol, History of stomach ulcers, Hypertrophy of nasal turbinates, Obstructive sleep apnea, Osteoporosis, Peptic ulcer, Polyposis of colon, Sensorineural hearing loss, and Tinnitus.   has a past surgical history that includes Appendectomy; Cardiac catheterization; Cholecystectomy; Colon surgery; Colonoscopy; Esophagogastroduodenoscopy; Hysterectomy; Turbinoplasty; Tonsillectomy (1968); Dilation and curettage of uterus (1986); Breast biopsy (1996); Shoulder surgery (Right, 12/2003); Carpal tunnel release (Bilateral, 02/2004); Hernia repair (2006); Breast biopsy (Right, 06/04/2012); Nasal hemorrhage control (01/2016); and Nasal reconstruction (2016).  family history includes Asthma in  her mother; Cancer in her other; Cancer (age of onset: 81) in her father; Cirrhosis (age of onset: 56) in her brother; Heart attack in her sister; Heart disease in her father, mother, and other; Heart disease (age of onset: 65) in her paternal grandfather; Heart disease (age of onset: 68) in her maternal grandfather; Hypertension in her other; Stroke in her mother; Stroke (age of onset: 48) in her maternal grandmother; Stroke (age of onset: 78) in her paternal grandmother.   reports that she has never smoked. She has never used smokeless tobacco. She reports that she drinks alcohol. Drug use questions deferred to the physician.  Allergies   Allergen Reactions   • Advair Diskus [Fluticasone-Salmeterol] Shortness Of Breath   • Albuterol Shortness Of Breath   • Albuterol Sulfate Shortness Of Breath     Throat closes.   • Beclomethasone Shortness Of Breath   • Lasix [Furosemide] Shortness Of Breath   • Levaquin [Levofloxacin] Shortness Of Breath     Chest pain.   • Lyrica [Pregabalin] Shortness Of Breath   • Nasonex [Mometasone Furoate] Shortness Of Breath   • Simvastatin Other (See Comments)     Mouth drawing, slurred speech, headaches.   • Spiriva Handihaler [Tiotropium Bromide Monohydrate] Shortness Of Breath   • Sulfa Antibiotics Shortness Of Breath     Mouth swelling.  Mouth swelling.   • Symbicort [Budesonide-Formoterol Fumarate] Shortness Of Breath   • Topamax [Topiramate] Shortness Of Breath   • Zithromax [Azithromycin] Shortness Of Breath   • Aspergillus (Mixed) Allergy Skin Test Dermatitis   • Ampicillin Other (See Comments)     Patient unsure of reaction.   • Cymbalta [Duloxetine Hcl] Mental Status Change   • Dicyclomine Other (See Comments)     Depression.   • Medrol [Methylprednisolone] Rash     Medications    Current Outpatient Medications:   •  albuterol (PROVENTIL HFA;VENTOLIN HFA) 108 (90 BASE) MCG/ACT inhaler, Inhale 2 puffs Every 4 (Four) Hours As Needed for Wheezing., Disp: , Rfl:   •  aspirin 81 MG  chewable tablet, Chew 81 mg Daily., Disp: , Rfl:   •  KRIS BREEZE 2 TEST disk, USE TO CHECK BLOOD SUGAR EVERY DAY, Disp: , Rfl: 11  •  bumetanide (BUMEX) 2 MG tablet, Take 2 mg by mouth As Needed., Disp: , Rfl:   •  dextromethorphan-guaifenesin (MUCINEX DM)  MG per 12 hr tablet, Take 1 tablet by mouth As Needed., Disp: , Rfl:   •  diphenhydrAMINE (BENADRYL) 25 MG tablet, Every 6 (Six) Hours., Disp: , Rfl:   •  EPINEPHrine (EPIPEN JR) 0.15 MG/0.3ML solution auto-injector injection, Inject as directed, Disp: , Rfl:   •  levocetirizine (XYZAL) 5 MG tablet, Take 1 tablet by mouth Every Evening for 30 days. 1 by mouth every day as needed for allergy symptoms, Disp: 30 tablet, Rfl: 3  •  meclizine (ANTIVERT) 25 MG tablet, Take 1 tablet by mouth As Needed., Disp: , Rfl:   •  MICROLET LANCETS misc, USE TO TEST DAILY, Disp: , Rfl:   •  montelukast (SINGULAIR) 10 MG tablet, Take 10 mg by mouth Every Night., Disp: , Rfl:   •  Naproxen Sodium 220 MG capsule, Take by mouth, Disp: , Rfl:   •  ondansetron ODT (ZOFRAN-ODT) 8 MG disintegrating tablet, Every 8 (Eight) Hours., Disp: , Rfl:   •  pantoprazole (PROTONIX) 40 MG EC tablet, Take 40 mg by mouth daily.  , Disp: , Rfl:   •  potassium chloride (K-DUR,KLOR-CON) 20 MEQ CR tablet, Take 1 tablet by mouth Daily., Disp: , Rfl:   •  promethazine (PHENERGAN) 25 MG tablet, Take 25 mg by mouth Every 6 (Six) Hours As Needed for Nausea or Vomiting., Disp: , Rfl:     Review of Systems   Constitutional: Negative for chills and fever.   HENT: Negative for congestion.    Eyes: Negative for visual disturbance.   Respiratory: Positive for apnea and shortness of breath. Negative for cough.    Cardiovascular: Negative for chest pain.   Gastrointestinal: Negative for diarrhea, nausea and vomiting.   Genitourinary: Negative for difficulty urinating.   Musculoskeletal: Positive for arthralgias.        She does have pain in her left wrist and is wearing a brace today.   Skin: Negative for rash.  "  Neurological: Negative for dizziness and speech difficulty.   Hematological: Negative for adenopathy.   Psychiatric/Behavioral: Positive for sleep disturbance. The patient is not nervous/anxious.      ------------------------------------  Objective   /90   Pulse 94   Ht 167.6 cm (66\")   Wt 124 kg (273 lb)   SpO2 99% Comment: RA  Breastfeeding? No   BMI 44.06 kg/m²   Physical Exam   Constitutional: She is oriented to person, place, and time. She appears well-developed.   She is a morbidly obese white female who appears in no acute distress.   HENT:   Head: Normocephalic and atraumatic.   She has some crowding of the posterior pharynx.   Eyes: EOM are normal. Pupils are equal, round, and reactive to light.   Neck: Normal range of motion. Neck supple.   Cardiovascular: Normal rate, regular rhythm and normal heart sounds.   Pulmonary/Chest: Effort normal and breath sounds normal.   Abdominal: Soft.   Musculoskeletal:   A brace is present on her left wrist.   Neurological: She is alert and oriented to person, place, and time.   Skin: Skin is warm and dry.   Psychiatric: She has a normal mood and affect.   Nursing note and vitals reviewed.          Pulmonary Functions Testing Results:  No results found for: FEV1, FVC, EMD5INJ, TLC, DLCO     Assessment/Plan   Alexandria was seen today for shortness of breath.    Diagnoses and all orders for this visit:    Mild intermittent asthma without complication    ADAM treated with BiPAP    Morbid obesity (CMS/[facility])      Patient's Body mass index is 44.06 kg/m². BMI is above normal parameters. Recommendations include: referral to primary care.      Again, we will see her back in about 2 months with complete pulmonary functions with and without bronchodilators on return and she may continue albuterol inhaler as needed for now.     "

## 2019-07-09 ENCOUNTER — HOSPITAL ENCOUNTER (OUTPATIENT)
Dept: MRI IMAGING | Facility: HOSPITAL | Age: 59
Discharge: HOME | End: 2019-07-09

## 2019-07-09 ENCOUNTER — HOSPITAL ENCOUNTER (OUTPATIENT)
Dept: MRI IMAGING | Facility: HOSPITAL | Age: 59
Discharge: HOME | End: 2019-07-09
Admitting: NURSE PRACTITIONER

## 2019-07-09 DIAGNOSIS — R42 VERTIGO: ICD-10-CM

## 2019-07-09 DIAGNOSIS — H90.3 SENSORINEURAL HEARING LOSS (SNHL) OF BOTH EARS: ICD-10-CM

## 2019-07-09 DIAGNOSIS — H93.13 TINNITUS OF BOTH EARS: ICD-10-CM

## 2019-07-09 LAB — CREAT BLDA-MCNC: 0.8 MG/DL (ref 0.6–1.3)

## 2019-07-09 PROCEDURE — 82565 ASSAY OF CREATININE: CPT

## 2019-07-09 PROCEDURE — A9577 INJ MULTIHANCE: HCPCS | Performed by: NURSE PRACTITIONER

## 2019-07-09 PROCEDURE — 0 GADOBENATE DIMEGLUMINE 529 MG/ML SOLUTION: Performed by: NURSE PRACTITIONER

## 2019-07-09 PROCEDURE — 70553 MRI BRAIN STEM W/O & W/DYE: CPT

## 2019-07-09 RX ADMIN — GADOBENATE DIMEGLUMINE 20 ML: 529 INJECTION, SOLUTION INTRAVENOUS at 12:45

## 2019-07-23 ENCOUNTER — TELEPHONE (OUTPATIENT)
Dept: OTOLARYNGOLOGY | Facility: CLINIC | Age: 59
End: 2019-07-23

## 2019-07-23 NOTE — TELEPHONE ENCOUNTER
Spoke with patient regarding MRI results. 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 Will schedule follow-up regarding this. Patient is in agreement.

## 2019-07-26 RX ORDER — MONTELUKAST SODIUM 10 MG/1
TABLET ORAL
Qty: 90 TABLET | Refills: 0 | Status: SHIPPED | OUTPATIENT
Start: 2019-07-26 | End: 2019-08-27 | Stop reason: SDUPTHER

## 2019-08-14 ENCOUNTER — OFFICE VISIT (OUTPATIENT)
Dept: OTOLARYNGOLOGY | Facility: CLINIC | Age: 59
End: 2019-08-14

## 2019-08-14 ENCOUNTER — LAB (OUTPATIENT)
Dept: LAB | Facility: HOSPITAL | Age: 59
End: 2019-08-14

## 2019-08-14 VITALS
SYSTOLIC BLOOD PRESSURE: 137 MMHG | WEIGHT: 274 LBS | TEMPERATURE: 98.2 F | DIASTOLIC BLOOD PRESSURE: 85 MMHG | BODY MASS INDEX: 44.03 KG/M2 | HEART RATE: 98 BPM | HEIGHT: 66 IN

## 2019-08-14 DIAGNOSIS — K11.8 PAROTID MASS: Primary | ICD-10-CM

## 2019-08-14 DIAGNOSIS — K11.8 PAROTID MASS: ICD-10-CM

## 2019-08-14 DIAGNOSIS — K11.7 XEROSTOMIA: ICD-10-CM

## 2019-08-14 PROCEDURE — 86235 NUCLEAR ANTIGEN ANTIBODY: CPT | Performed by: OTOLARYNGOLOGY

## 2019-08-14 PROCEDURE — 36415 COLL VENOUS BLD VENIPUNCTURE: CPT

## 2019-08-14 PROCEDURE — 99214 OFFICE O/P EST MOD 30 MIN: CPT | Performed by: OTOLARYNGOLOGY

## 2019-08-14 NOTE — PROGRESS NOTES
Mila Arnett   Patient Intake Note    Review of Systems  Review of Systems   Constitutional: Positive for chills and fatigue.   HENT:        See HPI   Eyes: Positive for pain, redness and itching.   Respiratory: Positive for cough, choking and shortness of breath.    Gastrointestinal: Positive for diarrhea, nausea and vomiting.   Musculoskeletal: Positive for neck pain and neck stiffness.   Neurological: Positive for light-headedness and headaches.   Psychiatric/Behavioral: Positive for sleep disturbance.   All other systems reviewed and are negative.      QUALITY MEASURES    Tobacco Use: Screening and Cessation Intervention  Social History    Tobacco Use      Smoking status: Never Smoker      Smokeless tobacco: Never Used        Mila Arnett  8/14/2019  12:58 PM

## 2019-08-14 NOTE — PROGRESS NOTES
Yobany Basurto MD     Chief Complaint   Patient presents with   • Follow-up        History of Present Illness  Alexandria Rizo is a  58 y.o. female who is here for follow up.  She had a recent MRI that had a incidental 8 mm left-sided parotid nodule.  She has not had a palpable nodule in the area.  She has had elevated rheumatoid lab work.    Review of Systems  Reviewed per patient intake note    Past History:  Past medical and surgical history, family history and social history reviewed and updated when appropriate.  Current medications and allergies reviewed and updated when appropriate.  Allergies:  Advair diskus [fluticasone-salmeterol]; Albuterol; Albuterol sulfate; Beclomethasone; Lasix [furosemide]; Levaquin [levofloxacin]; Lyrica [pregabalin]; Nasonex [mometasone furoate]; Simvastatin; Spiriva handihaler [tiotropium bromide monohydrate]; Sulfa antibiotics; Symbicort [budesonide-formoterol fumarate]; Topamax [topiramate]; Zithromax [azithromycin]; Aspergillus (mixed) allergy skin test; Ampicillin; Cymbalta [duloxetine hcl]; Dicyclomine; and Medrol [methylprednisolone]        Vital Signs:   Temp:  [98.2 °F (36.8 °C)] 98.2 °F (36.8 °C)  Heart Rate:  [98] 98  BP: (137)/(85) 137/85    Physical Exam:  CONSTITUTIONAL: well nourished, well-developed, alert, oriented, in no acute distress   COMMUNICATION AND VOICE: able to communicate normally, normal voice quality  HEAD: normocephalic, no lesions, atraumatic, no tenderness, no masses   FACE: appearance normal, no lesions, no tenderness, no deformities, facial motion symmetric  SALIVARY GLANDS: I do not palpate a nodule in her parotid glands.  EYES: ocular motility normal, eyelids normal, orbits normal, no proptosis, conjunctiva normal , pupils equal, round  HEARING: response to conversational voice normal bilaterally   EXTERNAL EARS: auricles without lesions  EXTERNAL NOSE: structure normal, no tenderness on palpation, no nasal discharge, no lesions, no  evidence of trauma, nostrils patent  LIPS: structure normal, no tenderness on palpation, no lesions, no evidence of trauma  NECK: neck appearance normal  LYMPH NODES: no lymphadenopathy  CHEST/RESPIRATORY: respiratory effort normal  CARDIOVASCULAR: extremities without cyanosis or edema, no overt jugulovenous distension present  NEUROLOGIC/PSYCHIATRIC: oriented appropriately for age, mood normal, affect appropriate, cranial nerves intact grossly unless specifically mentioned above     RESULTS REVIEW:    I have personally reviewed the patient's mri of the head images. There is a left deep parotid nodule 8 mm       Assessment   1. Parotid mass    2. Xerostomia        Plan    Medical and surgical options were discussed including observation, surgical management and fine needle aspiration. Risks, benefits and alternatives were discussed and questions were answered. After considering the options, the patient decided to proceed with fine needle aspiration.     Orders Placed This Encounter   Procedures   • US Guided Salivary Gland Biopsy Left   • Sjogren's Antibody, Anti-SS-A / -SS-B       Return in about 6 weeks (around 9/25/2019) for follow up after testing completed.    Yobany Basurto MD  08/14/19  1:24 PM

## 2019-08-15 LAB
ENA SS-A AB SER-ACNC: <0.2 AI (ref 0–0.9)
ENA SS-B AB SER-ACNC: 0.2 AI (ref 0–0.9)

## 2019-08-19 ENCOUNTER — OFFICE VISIT (OUTPATIENT)
Dept: PULMONOLOGY | Facility: CLINIC | Age: 59
End: 2019-08-19

## 2019-08-19 VITALS
HEART RATE: 70 BPM | WEIGHT: 275 LBS | OXYGEN SATURATION: 97 % | BODY MASS INDEX: 45.82 KG/M2 | HEIGHT: 65 IN | DIASTOLIC BLOOD PRESSURE: 70 MMHG | SYSTOLIC BLOOD PRESSURE: 116 MMHG

## 2019-08-19 DIAGNOSIS — J45.20 MILD INTERMITTENT ASTHMA WITHOUT COMPLICATION: Primary | ICD-10-CM

## 2019-08-19 DIAGNOSIS — J45.41 MODERATE PERSISTENT ASTHMA WITH ACUTE EXACERBATION: Primary | ICD-10-CM

## 2019-08-19 DIAGNOSIS — G47.33 OSA TREATED WITH BIPAP: ICD-10-CM

## 2019-08-19 DIAGNOSIS — K11.8 PAROTID MASS: Primary | ICD-10-CM

## 2019-08-19 DIAGNOSIS — E66.01 MORBID OBESITY (HCC): ICD-10-CM

## 2019-08-19 LAB
DIFF CAP.CO: NORMAL ML/MMHG SEC
FEV1/FVC: NORMAL %
FEV1: NORMAL LITERS
FVC VOL RESPIRATORY: NORMAL LITERS
TLC: NORMAL LITERS

## 2019-08-19 PROCEDURE — 36415 COLL VENOUS BLD VENIPUNCTURE: CPT | Performed by: INTERNAL MEDICINE

## 2019-08-19 PROCEDURE — 99214 OFFICE O/P EST MOD 30 MIN: CPT | Performed by: INTERNAL MEDICINE

## 2019-08-19 PROCEDURE — 94727 GAS DIL/WSHOT DETER LNG VOL: CPT | Performed by: INTERNAL MEDICINE

## 2019-08-19 PROCEDURE — 94729 DIFFUSING CAPACITY: CPT | Performed by: INTERNAL MEDICINE

## 2019-08-19 PROCEDURE — 94060 EVALUATION OF WHEEZING: CPT | Performed by: INTERNAL MEDICINE

## 2019-08-19 RX ORDER — LEVOCETIRIZINE DIHYDROCHLORIDE 5 MG/1
TABLET, FILM COATED ORAL
COMMUNITY
Start: 2019-06-17

## 2019-08-19 NOTE — PATIENT INSTRUCTIONS
I advised the patient her pulmonary function studies were essentially normal.  She still is having significant problems with cough and congestion and I am going to check an IgE level and CBC with differential to assess her for eosinophilic or allergic asthma causing her current symptoms for which she might eventually be a candidate for biologic therapy.  I will see her back in several months otherwise and we will call with lab results when available.

## 2019-08-19 NOTE — PROGRESS NOTES
Subjective   Alexandria Rizo is a 58 y.o. female.     Chief Complaint   Patient presents with   • Asthma      No My Sticky Note on file.    History of Present Illness   Patient still has some problems with cough congestion wheezing.  She also states that when she would drink cold milk she would tend to have an asthma attack which she would drink some other cold substance she would have these problems so she is wondering if this may relate to a food allergy and I told her to follow-up with Dr. Yang on this.  Also I told her we will check an IgE level and CBC with differential today to see if there is any evidence of allergic or eosinophilic asthma which might benefit from biologic therapy which could be followed up on by Dr. Yang.  She is also being followed by ENT as well.    Medical/Family/Social History   has a past medical history of Allergic rhinitis, Arthritis, Asthma, Chronic rhinitis, Depression, Deviated nasal septum, Diabetes (CMS/HCC), Diverticulosis of colon, Dizziness, GERD (gastroesophageal reflux disease), High cholesterol, History of stomach ulcers, Hypertrophy of nasal turbinates, Obstructive sleep apnea, Osteoporosis, Peptic ulcer, Polyposis of colon, Sensorineural hearing loss, and Tinnitus.   has a past surgical history that includes Appendectomy; Cardiac catheterization; Cholecystectomy; Colon surgery; Colonoscopy; Esophagogastroduodenoscopy; Hysterectomy; Turbinoplasty; Tonsillectomy (1968); Dilation and curettage of uterus (1986); Breast biopsy (1996); Shoulder surgery (Right, 12/2003); Carpal tunnel release (Bilateral, 02/2004); Hernia repair (2006); Breast biopsy (Right, 06/04/2012); Nasal hemorrhage control (01/2016); and Nasal reconstruction (2016).  family history includes Asthma in her mother; Cancer in her other; Cancer (age of onset: 81) in her father; Cirrhosis (age of onset: 56) in her brother; Heart attack in her sister; Heart disease in her father, mother, and other; Heart  disease (age of onset: 65) in her paternal grandfather; Heart disease (age of onset: 68) in her maternal grandfather; Hypertension in her other; Stroke in her mother; Stroke (age of onset: 48) in her maternal grandmother; Stroke (age of onset: 78) in her paternal grandmother.   reports that she has never smoked. She has never used smokeless tobacco. She reports that she drinks alcohol. Drug use questions deferred to the physician.  Allergies   Allergen Reactions   • Advair Diskus [Fluticasone-Salmeterol] Shortness Of Breath   • Albuterol Shortness Of Breath   • Albuterol Sulfate Shortness Of Breath     Throat closes.   • Beclomethasone Shortness Of Breath   • Lasix [Furosemide] Shortness Of Breath   • Levaquin [Levofloxacin] Shortness Of Breath     Chest pain.   • Lyrica [Pregabalin] Shortness Of Breath   • Nasonex [Mometasone Furoate] Shortness Of Breath   • Simvastatin Other (See Comments)     Mouth drawing, slurred speech, headaches.   • Spiriva Handihaler [Tiotropium Bromide Monohydrate] Shortness Of Breath   • Sulfa Antibiotics Shortness Of Breath     Mouth swelling.  Mouth swelling.   • Symbicort [Budesonide-Formoterol Fumarate] Shortness Of Breath   • Topamax [Topiramate] Shortness Of Breath   • Zithromax [Azithromycin] Shortness Of Breath   • Aspergillus (Mixed) Allergy Skin Test Dermatitis   • Ampicillin Other (See Comments)     Patient unsure of reaction.   • Cymbalta [Duloxetine Hcl] Mental Status Change   • Dicyclomine Other (See Comments)     Depression.   • Medrol [Methylprednisolone] Rash     Medications    Current Outpatient Medications:   •  albuterol (PROVENTIL HFA;VENTOLIN HFA) 108 (90 BASE) MCG/ACT inhaler, Inhale 2 puffs Every 4 (Four) Hours As Needed for Wheezing., Disp: , Rfl:   •  aspirin 81 MG chewable tablet, Chew 81 mg Daily., Disp: , Rfl:   •  KRIS BREEZE 2 TEST disk, USE TO CHECK BLOOD SUGAR EVERY DAY, Disp: , Rfl: 11  •  bumetanide (BUMEX) 2 MG tablet, Take 2 mg by mouth As Needed.,  "Disp: , Rfl:   •  dextromethorphan-guaifenesin (MUCINEX DM)  MG per 12 hr tablet, Take 1 tablet by mouth As Needed., Disp: , Rfl:   •  diphenhydrAMINE (BENADRYL) 25 MG tablet, Every 6 (Six) Hours., Disp: , Rfl:   •  EPINEPHrine (EPIPEN JR) 0.15 MG/0.3ML solution auto-injector injection, Inject as directed, Disp: , Rfl:   •  levocetirizine (XYZAL) 5 MG tablet, , Disp: , Rfl:   •  meclizine (ANTIVERT) 25 MG tablet, Take 1 tablet by mouth As Needed., Disp: , Rfl:   •  MICROLET LANCETS misc, USE TO TEST DAILY, Disp: , Rfl:   •  montelukast (SINGULAIR) 10 MG tablet, TAKE ONE TABLET BY MOUTH DAILY, Disp: 90 tablet, Rfl: 0  •  Naproxen Sodium 220 MG capsule, Take by mouth, Disp: , Rfl:   •  ondansetron ODT (ZOFRAN-ODT) 8 MG disintegrating tablet, Every 8 (Eight) Hours., Disp: , Rfl:   •  pantoprazole (PROTONIX) 40 MG EC tablet, Take 40 mg by mouth daily.  , Disp: , Rfl:   •  potassium chloride (K-DUR,KLOR-CON) 20 MEQ CR tablet, Take 1 tablet by mouth Daily., Disp: , Rfl:   •  promethazine (PHENERGAN) 25 MG tablet, Take 25 mg by mouth Every 6 (Six) Hours As Needed for Nausea or Vomiting., Disp: , Rfl:     Review of Systems  Constitutional: Negative for chills and fever.   HENT: Negative for congestion.    Eyes: Negative for visual disturbance.   Respiratory: Positive for apnea and shortness of breath. Negative for cough.    Cardiovascular: Negative for chest pain.   Gastrointestinal: Negative for diarrhea, nausea and vomiting.   Genitourinary: Negative for difficulty urinating.   Musculoskeletal: Positive for arthralgias  Skin: Negative for rash.   Neurological: Negative for dizziness and speech difficulty.   Hematological: Negative for adenopathy.   Psychiatric/Behavioral: Positive for sleep disturbance. The patient is not nervous/anxious.    Objective   /70   Pulse 70   Ht 163.8 cm (64.5\")   Wt 125 kg (275 lb)   SpO2 97% Comment: RA  Breastfeeding? No   BMI 46.47 kg/m²   Physical Exam    Constitutional: " She is oriented to person, place, and time. She appears well-developed.   She is a morbidly obese white female who appears in no acute distress.   HENT:   Head: Normocephalic and atraumatic.   She has some crowding of the posterior pharynx.   Eyes: EOM are normal. Pupils are equal, round, and reactive to light.   Neck: Normal range of motion. Neck supple.   Cardiovascular: Normal rate, regular rhythm and normal heart sounds.   Pulmonary/Chest: Effort normal and breath sounds normal.   Abdominal: Soft.   Musculoskeletal:  She still has some tenderness of the left wrist.  Neurological: She is alert and oriented to person, place, and time.   Skin: Skin is warm and dry.   Psychiatric: She has a normal mood and affect.   Nursing note and vitals reviewed.  Pulmonary Functions Testing Results:  FEV1   Date Value Ref Range Status   08/19/2019 101% liters Final     FVC   Date Value Ref Range Status   08/19/2019 95% liters Final     FEV1/FVC   Date Value Ref Range Status   08/19/2019 85.81% % Final     TLC   Date Value Ref Range Status   08/19/2019 96% liters Final     DLCO   Date Value Ref Range Status   08/19/2019 126% ml/mmHg sec Final      My interpretation of PFT:  1.  Prebronchodilator spirometry is within normal limits.  2.  There is no significant change in inspiratory postbronchodilator.  3.  Other than an inability to measure expiratory reserve volume, lung volumes are within normal limits.  4.  Diffusion capacity is within normal limits when corrected for alveolar volume is supranormal.  5.  She does have a drop in her FVC and FEV1 compared to studies done last June but her current studies are still within normal limits.  Assessment/Plan   Alexandria was seen today for asthma.    Diagnoses and all orders for this visit:    Moderate persistent asthma with acute exacerbation  -     Pulmonary Function Test  -     IgE  -     CBC & Differential    ADAM treated with BiPAP    Morbid obesity (CMS/[facility])      Patient's Body mass  index is 46.47 kg/m². BMI is above normal parameters. Recommendations include: referral to primary care.      I will obtain the aforementioned laboratory studies and call with results.  We will also send a copy of today's note to Dr. Yang and to Dr. Basurto.

## 2019-08-20 ENCOUNTER — APPOINTMENT (OUTPATIENT)
Dept: ULTRASOUND IMAGING | Facility: HOSPITAL | Age: 59
End: 2019-08-20

## 2019-08-22 LAB
BASOPHILS # BLD AUTO: 0 X10E3/UL (ref 0–0.2)
BASOPHILS NFR BLD AUTO: 0 %
EOSINOPHIL # BLD AUTO: 0.1 X10E3/UL (ref 0–0.4)
EOSINOPHIL NFR BLD AUTO: 1 %
ERYTHROCYTE [DISTWIDTH] IN BLOOD BY AUTOMATED COUNT: 13.4 % (ref 12.3–15.4)
HCT VFR BLD AUTO: 36.8 % (ref 34–46.6)
HGB BLD-MCNC: 12.1 G/DL (ref 11.1–15.9)
IGE SERPL-ACNC: 61 IU/ML (ref 6–495)
IMM GRANULOCYTES # BLD AUTO: 0 X10E3/UL (ref 0–0.1)
IMM GRANULOCYTES NFR BLD AUTO: 0 %
LYMPHOCYTES # BLD AUTO: 2 X10E3/UL (ref 0.7–3.1)
LYMPHOCYTES NFR BLD AUTO: 22 %
MCH RBC QN AUTO: 28.6 PG (ref 26.6–33)
MCHC RBC AUTO-ENTMCNC: 32.9 G/DL (ref 31.5–35.7)
MCV RBC AUTO: 87 FL (ref 79–97)
MONOCYTES # BLD AUTO: 0.7 X10E3/UL (ref 0.1–0.9)
MONOCYTES NFR BLD AUTO: 7 %
NEUTROPHILS # BLD AUTO: 6.5 X10E3/UL (ref 1.4–7)
NEUTROPHILS NFR BLD AUTO: 70 %
PLATELET # BLD AUTO: 338 X10E3/UL (ref 150–450)
RBC # BLD AUTO: 4.23 X10E6/UL (ref 3.77–5.28)
WBC # BLD AUTO: 9.3 X10E3/UL (ref 3.4–10.8)

## 2019-08-26 ENCOUNTER — TELEPHONE (OUTPATIENT)
Dept: GASTROENTEROLOGY | Facility: CLINIC | Age: 59
End: 2019-08-26

## 2019-08-26 NOTE — TELEPHONE ENCOUNTER
Patient stated we keep sending her letter RE: Colonoscopy Recall.     She had colonoscopy by Dr. Yan 12/21/2016 - ( I requested copy - Sent to  to review)     Patient was needing to know if she needed to repeat procedure.     Have no issues at this time.

## 2019-08-27 RX ORDER — MONTELUKAST SODIUM 10 MG/1
TABLET ORAL
Qty: 90 TABLET | Refills: 3 | Status: SHIPPED | OUTPATIENT
Start: 2019-08-27 | End: 2020-10-15

## 2019-08-29 NOTE — TELEPHONE ENCOUNTER
No need for repeat colonoscopy at this time unless she is having problems    Does she wish to continue with Dr Nicole?  If she does we can place in recall for 2021    Thank you

## 2019-09-18 ENCOUNTER — TELEPHONE (OUTPATIENT)
Dept: SURGERY | Age: 59
End: 2019-09-18

## 2019-10-16 ENCOUNTER — HOSPITAL ENCOUNTER (OUTPATIENT)
Dept: WOMENS IMAGING | Age: 59
Discharge: HOME | End: 2019-10-16
Payer: MEDICARE

## 2019-10-16 ENCOUNTER — OFFICE VISIT (OUTPATIENT)
Dept: SURGERY | Age: 59
End: 2019-10-16
Payer: MEDICARE

## 2019-10-16 ENCOUNTER — HOSPITAL ENCOUNTER (OUTPATIENT)
Age: 59
Setting detail: SPECIMEN
Discharge: HOME | End: 2019-10-16
Payer: MEDICARE

## 2019-10-16 VITALS — TEMPERATURE: 98.6 F | WEIGHT: 282 LBS | BODY MASS INDEX: 45.32 KG/M2 | HEIGHT: 66 IN

## 2019-10-16 DIAGNOSIS — Z12.31 VISIT FOR SCREENING MAMMOGRAM: Primary | ICD-10-CM

## 2019-10-16 DIAGNOSIS — Z12.31 VISIT FOR SCREENING MAMMOGRAM: ICD-10-CM

## 2019-10-16 LAB — HEREDITARY CANCER TEST-MYRIAD: NORMAL

## 2019-10-16 PROCEDURE — G8417 CALC BMI ABV UP PARAM F/U: HCPCS | Performed by: PHYSICIAN ASSISTANT

## 2019-10-16 PROCEDURE — 77063 BREAST TOMOSYNTHESIS BI: CPT

## 2019-10-16 PROCEDURE — 36415 COLL VENOUS BLD VENIPUNCTURE: CPT

## 2019-10-16 PROCEDURE — 1036F TOBACCO NON-USER: CPT | Performed by: PHYSICIAN ASSISTANT

## 2019-10-16 PROCEDURE — G8598 ASA/ANTIPLAT THER USED: HCPCS | Performed by: PHYSICIAN ASSISTANT

## 2019-10-16 PROCEDURE — G8427 DOCREV CUR MEDS BY ELIG CLIN: HCPCS | Performed by: PHYSICIAN ASSISTANT

## 2019-10-16 PROCEDURE — G8484 FLU IMMUNIZE NO ADMIN: HCPCS | Performed by: PHYSICIAN ASSISTANT

## 2019-10-16 PROCEDURE — 99213 OFFICE O/P EST LOW 20 MIN: CPT | Performed by: PHYSICIAN ASSISTANT

## 2019-10-16 PROCEDURE — 3017F COLORECTAL CA SCREEN DOC REV: CPT | Performed by: PHYSICIAN ASSISTANT

## 2019-10-28 ENCOUNTER — TELEPHONE (OUTPATIENT)
Dept: OTHER | Age: 59
End: 2019-10-28

## 2019-10-28 ENCOUNTER — TELEPHONE (OUTPATIENT)
Dept: SURGERY | Age: 59
End: 2019-10-28

## 2019-11-04 ENCOUNTER — OFFICE VISIT (OUTPATIENT)
Dept: SURGERY | Age: 59
End: 2019-11-04
Payer: MEDICARE

## 2019-11-04 DIAGNOSIS — Z71.83 ENCOUNTER FOR NONPROCREATIVE GENETIC COUNSELING: Primary | ICD-10-CM

## 2019-11-04 PROCEDURE — G8428 CUR MEDS NOT DOCUMENT: HCPCS | Performed by: PHYSICIAN ASSISTANT

## 2019-11-04 PROCEDURE — 1036F TOBACCO NON-USER: CPT | Performed by: PHYSICIAN ASSISTANT

## 2019-11-04 PROCEDURE — G8484 FLU IMMUNIZE NO ADMIN: HCPCS | Performed by: PHYSICIAN ASSISTANT

## 2019-11-04 PROCEDURE — G8417 CALC BMI ABV UP PARAM F/U: HCPCS | Performed by: PHYSICIAN ASSISTANT

## 2019-11-04 PROCEDURE — 3017F COLORECTAL CA SCREEN DOC REV: CPT | Performed by: PHYSICIAN ASSISTANT

## 2019-11-04 PROCEDURE — G8598 ASA/ANTIPLAT THER USED: HCPCS | Performed by: PHYSICIAN ASSISTANT

## 2019-11-04 PROCEDURE — 99214 OFFICE O/P EST MOD 30 MIN: CPT | Performed by: PHYSICIAN ASSISTANT

## 2019-11-06 ENCOUNTER — TELEPHONE (OUTPATIENT)
Dept: OTHER | Age: 59
End: 2019-11-06

## 2019-11-15 ENCOUNTER — TELEPHONE (OUTPATIENT)
Dept: SURGERY | Age: 59
End: 2019-11-15

## 2019-11-27 ENCOUNTER — APPOINTMENT (OUTPATIENT)
Dept: ULTRASOUND IMAGING | Facility: HOSPITAL | Age: 59
End: 2019-11-27

## 2020-02-19 ENCOUNTER — OFFICE VISIT (OUTPATIENT)
Dept: PULMONOLOGY | Facility: CLINIC | Age: 60
End: 2020-02-19

## 2020-02-19 VITALS
SYSTOLIC BLOOD PRESSURE: 130 MMHG | HEIGHT: 65 IN | WEIGHT: 289 LBS | OXYGEN SATURATION: 99 % | BODY MASS INDEX: 48.15 KG/M2 | DIASTOLIC BLOOD PRESSURE: 80 MMHG | HEART RATE: 63 BPM

## 2020-02-19 DIAGNOSIS — J45.20 MILD INTERMITTENT ASTHMA WITHOUT COMPLICATION: Primary | ICD-10-CM

## 2020-02-19 DIAGNOSIS — G47.33 OSA TREATED WITH BIPAP: ICD-10-CM

## 2020-02-19 DIAGNOSIS — E66.01 MORBID OBESITY (HCC): ICD-10-CM

## 2020-02-19 PROCEDURE — 99214 OFFICE O/P EST MOD 30 MIN: CPT | Performed by: INTERNAL MEDICINE

## 2020-02-19 NOTE — PATIENT INSTRUCTIONS
The patient had some exposure to carbon monoxide and also had some mold exposure recently but has recovered from these exposures.  She is done well from the standpoint of her asthma otherwise.  We will plan on a one-year follow-up with a flow volume loop.

## 2020-02-20 NOTE — PROGRESS NOTES
Subjective   Alexandria Rizo is a 59 y.o. female.     Chief Complaint   Patient presents with   • Asthma      No My Sticky Note on file.    History of Present Illness     The patient is done very well from the standpoint of her asthma since her last visit.  She is having no acute respiratory tract complaints at this time.  I did tell we will go to once yearly visits and do a flow volume loop on return and she is in agreement with this plan.  She had some recent exposure to mold and also states that there was some elevated carbon monoxide in her home and she has had this corrected.  I told her that unless she had severe neurologic symptoms it would be unusual to have any long-term sequela of the carbon monoxide exposure and her major symptoms were headache and some difficulty concentrating.  Medical/Family/Social History   has a past medical history of Allergic rhinitis, Arthritis, Asthma, Chronic rhinitis, Depression, Deviated nasal septum, Diabetes (CMS/HCC), Diverticulosis of colon, Dizziness, GERD (gastroesophageal reflux disease), High cholesterol, History of stomach ulcers, Hypertrophy of nasal turbinates, Obstructive sleep apnea, Osteoporosis, Peptic ulcer, Polyposis of colon, Sensorineural hearing loss, and Tinnitus.   has a past surgical history that includes Appendectomy; Cardiac catheterization; Cholecystectomy; Colon surgery; Colonoscopy; Esophagogastroduodenoscopy; Hysterectomy; Turbinoplasty; Tonsillectomy (1968); Dilation and curettage of uterus (1986); Breast biopsy (1996); Shoulder surgery (Right, 12/2003); Carpal tunnel release (Bilateral, 02/2004); Hernia repair (2006); Breast biopsy (Right, 06/04/2012); Nasal hemorrhage control (01/2016); and Nasal reconstruction (2016).  family history includes Asthma in her mother; Cancer in an other family member; Cancer (age of onset: 81) in her father; Cirrhosis (age of onset: 56) in her brother; Heart attack in her sister; Heart disease in her father,  mother, and another family member; Heart disease (age of onset: 65) in her paternal grandfather; Heart disease (age of onset: 68) in her maternal grandfather; Hypertension in an other family member; Stroke in her mother; Stroke (age of onset: 48) in her maternal grandmother; Stroke (age of onset: 78) in her paternal grandmother.   reports that she has never smoked. She has never used smokeless tobacco. She reports that she drinks alcohol. Drug use questions deferred to the physician.  Allergies   Allergen Reactions   • Advair Diskus [Fluticasone-Salmeterol] Shortness Of Breath   • Albuterol Shortness Of Breath   • Albuterol Sulfate Shortness Of Breath     Throat closes.   • Beclomethasone Shortness Of Breath   • Lasix [Furosemide] Shortness Of Breath   • Levaquin [Levofloxacin] Shortness Of Breath     Chest pain.   • Lyrica [Pregabalin] Shortness Of Breath   • Nasonex [Mometasone Furoate] Shortness Of Breath   • Simvastatin Other (See Comments)     Mouth drawing, slurred speech, headaches.   • Spiriva Handihaler [Tiotropium Bromide Monohydrate] Shortness Of Breath   • Sulfa Antibiotics Shortness Of Breath     Mouth swelling.  Mouth swelling.   • Symbicort [Budesonide-Formoterol Fumarate] Shortness Of Breath   • Topamax [Topiramate] Shortness Of Breath   • Zithromax [Azithromycin] Shortness Of Breath   • Aspergillus (Mixed) Allergy Skin Test Dermatitis   • Ampicillin Other (See Comments)     Patient unsure of reaction.   • Cymbalta [Duloxetine Hcl] Mental Status Change   • Dicyclomine Other (See Comments)     Depression.   • Medrol [Methylprednisolone] Rash     Medications    Current Outpatient Medications:   •  albuterol (PROVENTIL HFA;VENTOLIN HFA) 108 (90 BASE) MCG/ACT inhaler, Inhale 2 puffs Every 4 (Four) Hours As Needed for Wheezing., Disp: , Rfl:   •  aspirin 81 MG chewable tablet, Chew 81 mg Daily., Disp: , Rfl:   •  bumetanide (BUMEX) 2 MG tablet, Take 2 mg by mouth As Needed., Disp: , Rfl:   •   "dextromethorphan-guaifenesin (MUCINEX DM)  MG per 12 hr tablet, Take 1 tablet by mouth As Needed., Disp: , Rfl:   •  diphenhydrAMINE (BENADRYL) 25 MG tablet, Every 6 (Six) Hours., Disp: , Rfl:   •  EPINEPHrine (EPIPEN JR) 0.15 MG/0.3ML solution auto-injector injection, Inject as directed, Disp: , Rfl:   •  Ergocalciferol (VITAMIN D2 PO), 1.25 mg 1 (One) Time Per Week., Disp: , Rfl:   •  levocetirizine (XYZAL) 5 MG tablet, , Disp: , Rfl:   •  meclizine (ANTIVERT) 25 MG tablet, Take 1 tablet by mouth As Needed., Disp: , Rfl:   •  montelukast (SINGULAIR) 10 MG tablet, TAKE ONE TABLET BY MOUTH DAILY, Disp: 90 tablet, Rfl: 3  •  Naproxen Sodium 220 MG capsule, Take by mouth, Disp: , Rfl:   •  ondansetron ODT (ZOFRAN-ODT) 8 MG disintegrating tablet, Every 8 (Eight) Hours., Disp: , Rfl:   •  pantoprazole (PROTONIX) 40 MG EC tablet, Take 40 mg by mouth daily.  , Disp: , Rfl:   •  potassium chloride (K-DUR,KLOR-CON) 20 MEQ CR tablet, Take 1 tablet by mouth Daily., Disp: , Rfl:   •  promethazine (PHENERGAN) 25 MG tablet, Take 25 mg by mouth Every 6 (Six) Hours As Needed for Nausea or Vomiting., Disp: , Rfl:     Review of Systems   Constitutional: Negative for chills and fever.   HENT: Negative for congestion.    Eyes: Negative for visual disturbance.   Respiratory: Positive for apnea. Negative for cough and shortness of breath.    Cardiovascular: Negative for chest pain.   Gastrointestinal: Negative for diarrhea, nausea and vomiting.   Genitourinary: Negative for difficulty urinating.   Musculoskeletal: Positive for arthralgias.   Skin: Negative for rash.   Neurological: Negative for dizziness and speech difficulty.   Psychiatric/Behavioral: Positive for sleep disturbance. The patient is not nervous/anxious.      ------------------------------------  Objective   /80   Pulse 63   Ht 163.8 cm (64.5\")   Wt 131 kg (289 lb)   SpO2 99% Comment: RA  Breastfeeding No   BMI 48.84 kg/m²   Physical Exam "   Constitutional: She is oriented to person, place, and time. She appears well-developed.   She is a morbidly obese white female.   HENT:   Head: Normocephalic and atraumatic.   There is some crowding of the posterior pharynx.   Eyes: Pupils are equal, round, and reactive to light. EOM are normal.   Neck: Normal range of motion. Neck supple.   Cardiovascular: Normal rate, regular rhythm and normal heart sounds.   Pulmonary/Chest: Effort normal and breath sounds normal.   Abdominal: Soft.   Musculoskeletal: Normal range of motion.   Neurological: She is alert and oriented to person, place, and time.   Skin: Skin is warm and dry.   Psychiatric: She has a normal mood and affect.   Nursing note and vitals reviewed.              Pulmonary Functions Testing Results:  PFT Values        Some values may be hidden. Unless noted otherwise, only the newest values recorded on each date are displayed.         Old Values PFT Results 8/19/19   FVC 95%   FEV1 101%   FEV1/FVC 85.81%   DLCO 126%   TLC 96%      Pre Drug PFT Results 8/19/19   No data to display.      Post Drug PFT Results 8/19/19   No data to display.      Other Tests PFT Results 8/19/19   No data to display.               Assessment/Plan   Alexandria was seen today for asthma.    Diagnoses and all orders for this visit:    Mild intermittent asthma without complication    ADAM treated with BiPAP    Morbid obesity (CMS/[facility])      Patient's Body mass index is 48.84 kg/m². BMI is above normal parameters. Recommendations include: referral to primary care.      I will see her back in 1 year with a flow volume loop on return.  Again her asthma is under good control on her current regimen.

## 2020-07-20 RX ORDER — LEVOCETIRIZINE DIHYDROCHLORIDE 5 MG/1
TABLET, FILM COATED ORAL
Qty: 30 TABLET | Refills: 0 | OUTPATIENT
Start: 2020-07-20

## 2020-10-15 RX ORDER — MONTELUKAST SODIUM 10 MG/1
TABLET ORAL
Qty: 90 TABLET | Refills: 3 | Status: SHIPPED | OUTPATIENT
Start: 2020-10-15

## 2020-10-15 NOTE — TELEPHONE ENCOUNTER
Pharmacy sent a request for refills on [facility]. Patient was last seen by Dr. Starr on 2/19/21 and is scheduled to come back on 2/19/21.  Refill request sent to [facility] for approval.

## 2020-10-19 ENCOUNTER — HOSPITAL ENCOUNTER (OUTPATIENT)
Dept: WOMENS IMAGING | Age: 60
Discharge: HOME | End: 2020-10-19
Payer: MEDICARE

## 2020-10-19 ENCOUNTER — OFFICE VISIT (OUTPATIENT)
Dept: SURGERY | Age: 60
End: 2020-10-19
Payer: MEDICARE

## 2020-10-19 VITALS
TEMPERATURE: 98.5 F | DIASTOLIC BLOOD PRESSURE: 73 MMHG | BODY MASS INDEX: 46.61 KG/M2 | SYSTOLIC BLOOD PRESSURE: 130 MMHG | WEIGHT: 290 LBS | HEART RATE: 72 BPM | HEIGHT: 66 IN

## 2020-10-19 PROCEDURE — G8484 FLU IMMUNIZE NO ADMIN: HCPCS | Performed by: PHYSICIAN ASSISTANT

## 2020-10-19 PROCEDURE — 99213 OFFICE O/P EST LOW 20 MIN: CPT | Performed by: PHYSICIAN ASSISTANT

## 2020-10-19 PROCEDURE — 1036F TOBACCO NON-USER: CPT | Performed by: PHYSICIAN ASSISTANT

## 2020-10-19 PROCEDURE — 77063 BREAST TOMOSYNTHESIS BI: CPT

## 2020-10-19 PROCEDURE — G8417 CALC BMI ABV UP PARAM F/U: HCPCS | Performed by: PHYSICIAN ASSISTANT

## 2020-10-19 PROCEDURE — G8427 DOCREV CUR MEDS BY ELIG CLIN: HCPCS | Performed by: PHYSICIAN ASSISTANT

## 2020-10-19 PROCEDURE — 3017F COLORECTAL CA SCREEN DOC REV: CPT | Performed by: PHYSICIAN ASSISTANT

## 2020-10-19 RX ORDER — PANTOPRAZOLE SODIUM 40 MG/10ML
40 INJECTION, POWDER, LYOPHILIZED, FOR SOLUTION INTRAVENOUS DAILY
COMMUNITY

## 2020-10-31 NOTE — PROGRESS NOTES
HPI:  William Vences is in for yearly follow-up breast check. She has not noticed any changes in her breasts. EXAMINATION: JIMMY DIGITAL SCREEN W OR WO CAD BILATERAL 10/19/2020 2:58    PM    HISTORY: Annual screening exam. No current breast complaints. Family    history of breast cancer in maternal aunt. Previous benign bilateral    excisional biopsies. Previous benign right stereotactic guided biopsy. FINDINGS:    Bilateral digital CC and MLO views obtained. Karla Flower is made to    exams dated 10/16/2019, 10/15/2018, 10/13/2017. 3-D tomosynthesis    views also obtained.  Computer-aided detection technology was utilized    for assessment of this examination. There are scattered areas of fibroglandular density (Category B). There is an area of architectural distortion in the left breast with    overlying scar marker, compatible with previous excisional biopsy    site. No dominant mass, unexpected architectural distortion, or    suspicious appearing microcalcifications within either breast.         Impression    1.  No mammographic evidence of malignancy within either breast.    Annual screening mammogram is recommended. 2.  BI-RADS category 2- Benign. BREAST EXAM:  On examination, she has fibrocystic changes throughout both breasts, no dominant masses, no skin or nipple changes and no axillary adenopathy. I see nothing suspicious for breast cancer. ASSESSMENT:  Benign fibrocystic changes              PLAN:  I will plan to see her back in 1 year for physical exam and mammograms. She will contact me if anything significant changes. 15 minutes spent, which includes face to face with patient, record review, evaluation, planning, and education. I spent over 50% of this visit counseling patient.

## 2021-02-19 ENCOUNTER — OFFICE VISIT (OUTPATIENT)
Dept: PULMONOLOGY | Facility: CLINIC | Age: 61
End: 2021-02-19

## 2021-02-19 VITALS
WEIGHT: 293 LBS | HEART RATE: 75 BPM | TEMPERATURE: 97.5 F | OXYGEN SATURATION: 99 % | DIASTOLIC BLOOD PRESSURE: 99 MMHG | BODY MASS INDEX: 48.82 KG/M2 | HEIGHT: 65 IN | SYSTOLIC BLOOD PRESSURE: 180 MMHG

## 2021-02-19 DIAGNOSIS — J45.20 MILD INTERMITTENT ASTHMA WITHOUT COMPLICATION: Primary | ICD-10-CM

## 2021-02-19 DIAGNOSIS — G47.33 OSA TREATED WITH BIPAP: ICD-10-CM

## 2021-02-19 DIAGNOSIS — E66.01 MORBID OBESITY (HCC): ICD-10-CM

## 2021-02-19 PROCEDURE — 99214 OFFICE O/P EST MOD 30 MIN: CPT | Performed by: INTERNAL MEDICINE

## 2021-02-19 NOTE — ASSESSMENT & PLAN NOTE
She will continue her BiPAP therapy.  I will try to get a download from [email or web address].  If this confirms good control of her apneas and she still had problems with being somnolent during the day will consider referral to the sleep clinic to see if she would be a candidate for something such as Provigil or Sunosi.  She states she like to hold off on this for now.

## 2021-02-19 NOTE — ASSESSMENT & PLAN NOTE
Her asthma is under good control.  She will continue her as needed albuterol and also her montelukast.

## 2021-02-19 NOTE — PATIENT INSTRUCTIONS
The patient is using her BiPAP routinely and states that per [employer] her [employer] company she is doing well with the BiPAP but she still states she does not sleep well.  I told her we will get a copy of the download.  If she had problems with daytime somnolence despite good control of her sleep apnea she might be a candidate for something such as Provigil or Sunosi.  We could arrange a sleep clinic referral if she wanted to consider this.  She is being evaluated for possible Sjogren's syndrome.  She has had another episode of car monoxide exposure which she had some persistent symptoms though she is better now.  We will plan on a follow-up in 1 year.  We will hold off on any follow-up pulmonary functions for now.  I did advise her if she did have a confirmed diagnosis of Sjogren's syndrome and had increasing respiratory symptoms that I would recommend we get the pulmonary function studies and we can consider a chest CT high-resolution but I do not think that will be necessary at present.

## 2021-02-19 NOTE — PROGRESS NOTES
Chief Complaint  Asthma and Sleep Apnea    Subjective    History of Present Illness {CC  Problem List  Visit Diagnosis   Encounters  Notes  Medications  Labs  Result Review Imaging  Media     Alexandria Rizo presents to [facility] for asthma and sleep apnea.    History of Present Illness   The patient had another episode of carbon monoxide exposure related to a problem with a heater at home.  She awakened from sleep with headache and some sensation of just not feeling right and did go outside to breathe fresh air.  She had some persistent symptoms up to a week later but is better now.  She is being evaluated for possible Sjogren's syndrome.  She is doing well overall from a respiratory standpoint at present and rarely has to use her rescue inhaler.  Based on this we will hold off on pulmonary functions for now.  If she had increasing symptomatology and particularly if she is found to have definite Sjogren's then an option would be to get PFTs and possibly a high-resolution chest CT but again I do not think that is necessary at present.  She is using her BiPAP routinely and states despite this she did not sleep that well.  She states her [employer] has advised her that her downloads appear to show good control of her apneas.  We will try to get a copy of these for the office records to confirm this.  If she were to have excessive daytime somnolence despite good control of her apneas and she might be a candidate for something such as Provigil or Sunosi.  I told her I would probably recommend a sleep clinic referral if we were going to consider this.  Prior to Admission medications    Medication Sig Start Date End Date Taking? Authorizing Provider   albuterol (PROVENTIL HFA;VENTOLIN HFA) 108 (90 BASE) MCG/ACT inhaler Inhale 2 puffs Every 4 (Four) Hours As Needed for Wheezing.   Yes Provider, MD Berkley   aspirin 81 MG chewable tablet Chew  81 mg Daily.   Yes Berkley Tomlin MD   bumetanide (BUMEX) 2 MG tablet Take 2 mg by mouth As Needed.   Yes Berkley Tomlin MD   dextromethorphan-guaifenesin (MUCINEX DM)  MG per 12 hr tablet Take 1 tablet by mouth As Needed.   Yes Berkley Tomlin MD   Diclofenac Sodium (VOLTAREN) 1 % gel gel Apply 4 g topically to the appropriate area as directed 4 (Four) Times a Day As Needed.   Yes Berkley Tomlin MD   diphenhydrAMINE (BENADRYL) 25 MG tablet Every 6 (Six) Hours.   Yes Berkley Tomlin MD   EPINEPHrine (EPIPEN JR) 0.15 MG/0.3ML solution auto-injector injection Inject as directed   Yes Berkley Tomlin MD   levocetirizine (XYZAL) 5 MG tablet  6/17/19  Yes Berkley Tomlin MD   meclizine (ANTIVERT) 25 MG tablet Take 1 tablet by mouth As Needed. 8/3/18  Yes Berkley Tomlin MD   montelukast (SINGULAIR) 10 MG tablet TAKE ONE TABLET BY MOUTH DAILY 10/15/20  Yes Shahrzad Hirsch APRN   Naproxen Sodium 220 MG capsule Take by mouth   Yes Berkley Tomlin MD   ondansetron ODT (ZOFRAN-ODT) 8 MG disintegrating tablet Every 8 (Eight) Hours.   Yes Berkley Tomlin MD   pantoprazole (PROTONIX) 40 MG EC tablet Take 40 mg by mouth daily.     Yes Berkley Tomlin MD   polyethyl glycol-propyl glycol (SYSTANE) 0.4-0.3 % solution ophthalmic solution Administer 2 drops to both eyes 2 (Two) Times a Day.   Yes Berkley Tomlin MD   potassium chloride (K-DUR,KLOR-CON) 20 MEQ CR tablet Take 1 tablet by mouth Daily.   Yes Berkley Tomlin MD   promethazine (PHENERGAN) 25 MG tablet Take 25 mg by mouth Every 6 (Six) Hours As Needed for Nausea or Vomiting.   Yes Berkley Tomlin MD   Ergocalciferol (VITAMIN D2 PO) 1.25 mg 1 (One) Time Per Week. 1/2/20   Berkley Tomlin MD       Social History     Socioeconomic History   • Marital status:      Spouse name: Not on file   • Number of children: Not on file   • Years of education: Not on file   • Highest  "education level: Not on file   Tobacco Use   • Smoking status: Never Smoker   • Smokeless tobacco: Never Used   Substance and Sexual Activity   • Alcohol use: Yes     Comment: socially   • Drug use: Defer       Objective   Vital Signs:   /99   Pulse 75   Temp 97.5 °F (36.4 °C)   Ht 165.1 cm (65\")   Wt (!) 137 kg (302 lb 3.2 oz)   SpO2 99% Comment: ra  BMI 50.29 kg/m²     Physical Exam  Vitals signs and nursing note reviewed.   Constitutional:       Appearance: She is obese.   HENT:      Head:      Comments: She is wearing a facial covering.  Eyes:      Extraocular Movements: Extraocular movements intact.      Pupils: Pupils are equal, round, and reactive to light.   Neck:      Comments: Her neck is thick.  Cardiovascular:      Rate and Rhythm: Normal rate and regular rhythm.      Pulses: Normal pulses.      Heart sounds: Normal heart sounds.   Abdominal:      Comments: Her abdomen is obese.   Musculoskeletal: Normal range of motion.   Skin:     General: Skin is warm and dry.   Neurological:      General: No focal deficit present.      Mental Status: She is oriented to person, place, and time.   Psychiatric:         Mood and Affect: Mood normal.        Result Review :{ Labs  Result Review  Imaging  Med Tab  Media :    PFT Values        Some values may be hidden. Unless noted otherwise, only the newest values recorded on each date are displayed.         Old Values PFT Results 8/19/19   FVC 95%   FEV1 101%   FEV1/FVC 85.81%   DLCO 126%   TLC 96%      Pre Drug PFT Results 8/19/19   No data to display.      Post Drug PFT Results 8/19/19   No data to display.      Other Tests PFT Results 8/19/19   No data to display.               Results for orders placed in visit on 08/19/19   Pulmonary Function Test                 Assessment and Plan {CC Problem List  Visit Diagnosis  ROS  Review (Popup)  Health Maintenance  Quality  BestPractice  Medications  SmartSets  SnapShot Encounters  Media  "     Problem List Items Addressed This Visit        Endocrine and Metabolic    Morbid obesity (CMS/HCC)    Current Assessment & Plan     She will follow-up with her primary care physician regarding her elevated BMI.            Pulmonary and Pneumonias    Mild intermittent asthma without complication - Primary    Current Assessment & Plan     Her asthma is under good control.  She will continue her as needed albuterol and also her montelukast.              Sleep    ADAM treated with BiPAP    Current Assessment & Plan     She will continue her BiPAP therapy.  I will try to get a download from [email or web address].  If this confirms good control of her apneas and she still had problems with being somnolent during the day will consider referral to the sleep clinic to see if she would be a candidate for something such as Provigil or Sunosi.  She states she like to hold off on this for now.               Patient's Body mass index is 50.29 kg/m². BMI is above normal parameters. Recommendations include: referral to primary care.          Nicolás Starr MD  2/19/2021  11:22 CST    Follow Up {Instructions Charge Capture  Follow-up Communications   Return in about 1 year (around 2/19/2022) for To see me specifically.    Patient was given instructions and counseling regarding her condition or for health maintenance advice. Please see specific information pulled into the AVS if appropriate.  I will see her back in 1 year.  Again if she has increasing respiratory symptoms in the interim we can get pulmonary functions the hospital and even consider high-resolution chest CT if she were felt to have Sjogren's syndrome and pulmonary function suggested any interstitial disease.

## 2021-02-22 ENCOUNTER — TELEPHONE (OUTPATIENT)
Dept: PULMONOLOGY | Facility: CLINIC | Age: 61
End: 2021-02-22

## 2021-02-22 NOTE — TELEPHONE ENCOUNTER
Adriana, can we check with [employer] and see about getting a download of her BiPAP compliance.  She states [employer] has told her she is doing well but she still has a lot of difficulties with sleeping.

## 2021-03-08 ENCOUNTER — TELEPHONE (OUTPATIENT)
Dept: PULMONOLOGY | Facility: CLINIC | Age: 61
End: 2021-03-08

## 2021-03-08 NOTE — TELEPHONE ENCOUNTER
I spoke to pt.  She found out something was wrong with her machine.  She is on a loaner and already feeling better.  She will call us back if she needs anything else.

## 2021-03-08 NOTE — TELEPHONE ENCOUNTER
Author: Nicolás Starr MD Service: -- Author Type: Physician   Filed: 03/05/21 1542 Encounter Date: 2/23/2021 Status: Signed   : Nicolás Starr MD (Physician)        Show:Clear all  [x]Manual[]Template[]Copied    Added by:  [x]Nicolás Starr MD    []Hover for details  Adriana, please call the patient and let her know we received her download of her PAP compliance.  She has excellent control of her apneas and does not have any major leak issues.  If she still feels she is having a difficult time sleeping I would recommend she be reassessed at the sleep clinic and we can make a referral if she would like us to do so.

## 2021-10-26 ENCOUNTER — OFFICE VISIT (OUTPATIENT)
Dept: SURGERY | Age: 61
End: 2021-10-26
Payer: MEDICARE

## 2021-10-26 ENCOUNTER — HOSPITAL ENCOUNTER (OUTPATIENT)
Dept: WOMENS IMAGING | Age: 61
Discharge: HOME | End: 2021-10-26
Payer: MEDICARE

## 2021-10-26 VITALS
HEIGHT: 66 IN | WEIGHT: 293 LBS | SYSTOLIC BLOOD PRESSURE: 129 MMHG | BODY MASS INDEX: 47.09 KG/M2 | HEART RATE: 75 BPM | TEMPERATURE: 98.6 F | DIASTOLIC BLOOD PRESSURE: 85 MMHG

## 2021-10-26 DIAGNOSIS — Z12.31 VISIT FOR SCREENING MAMMOGRAM: Primary | ICD-10-CM

## 2021-10-26 DIAGNOSIS — Z12.31 VISIT FOR SCREENING MAMMOGRAM: ICD-10-CM

## 2021-10-26 PROCEDURE — G8484 FLU IMMUNIZE NO ADMIN: HCPCS | Performed by: PHYSICIAN ASSISTANT

## 2021-10-26 PROCEDURE — G8417 CALC BMI ABV UP PARAM F/U: HCPCS | Performed by: PHYSICIAN ASSISTANT

## 2021-10-26 PROCEDURE — G8427 DOCREV CUR MEDS BY ELIG CLIN: HCPCS | Performed by: PHYSICIAN ASSISTANT

## 2021-10-26 PROCEDURE — 77067 SCR MAMMO BI INCL CAD: CPT

## 2021-10-26 PROCEDURE — 3017F COLORECTAL CA SCREEN DOC REV: CPT | Performed by: PHYSICIAN ASSISTANT

## 2021-10-26 PROCEDURE — 99213 OFFICE O/P EST LOW 20 MIN: CPT | Performed by: PHYSICIAN ASSISTANT

## 2021-10-26 PROCEDURE — 1036F TOBACCO NON-USER: CPT | Performed by: PHYSICIAN ASSISTANT

## 2021-10-26 NOTE — PROGRESS NOTES
HPI:  Shola Santiago is in for yearly follow-up breast check. She has not noticed any changes in her breasts. Her imaging was reviewed and is noted below. EXAMINATION: JIMMY DIGITAL SCREEN W OR WO CAD BILATERAL 10/26/2021 10:26   AM   HISTORY: SCREENING BILATERAL DIGITAL MAMMOGRAM WITH TOMOSYNTHESIS   10/26/2021 8:50 AM   CLINICAL HISTORY: Screening.     COMPARISON STUDIES: October 19, 2020 October 16, 2019 October 15,   2018. FINDINGS:    Digital CC and MLO views of bilateral breasts were obtained. Tomosynthesis in the MLO and CC projections was also performed. There are scattered fibroglandular densities (approximately 25-50%   glandular tissue) consistent with a type B parenchymal pattern. No   suspicious masses or calcifications are identified. There is no architectural distortion.    This study was interpreted with CAD. IMPRESSION AND RECOMMENDATION:    No mammographic evidence of malignancy. Recommendation is for the   patient to return for routine mammography in one year or sooner, if   clinically indicated.    Assessment: BI-RADS Category 2 benign       BREAST EXAM:  On examination, she has fibrocystic changes throughout both breasts, no dominant masses, no skin or nipple changes and no axillary adenopathy. I see nothing suspicious for breast cancer. ASSESSMENT:  Benign fibrocystic changes              PLAN:  I will plan to see her back in 1 year for physical exam and bilateral mammograms. She will contact me if anything significant changes. 20 minutes spent, which includes face to face with patient, record review, evaluation, planning, and education.

## 2021-12-09 ENCOUNTER — OFFICE VISIT (OUTPATIENT)
Dept: GASTROENTEROLOGY | Facility: CLINIC | Age: 61
End: 2021-12-09

## 2021-12-09 ENCOUNTER — LAB (OUTPATIENT)
Dept: LAB | Facility: HOSPITAL | Age: 61
End: 2021-12-09

## 2021-12-09 VITALS
HEIGHT: 66 IN | OXYGEN SATURATION: 98 % | BODY MASS INDEX: 47.09 KG/M2 | HEART RATE: 74 BPM | TEMPERATURE: 98 F | WEIGHT: 293 LBS | DIASTOLIC BLOOD PRESSURE: 88 MMHG | SYSTOLIC BLOOD PRESSURE: 160 MMHG

## 2021-12-09 DIAGNOSIS — R74.8 ELEVATED LIVER ENZYMES: ICD-10-CM

## 2021-12-09 DIAGNOSIS — Z86.010 HISTORY OF ADENOMATOUS POLYP OF COLON: Primary | ICD-10-CM

## 2021-12-09 DIAGNOSIS — R94.5 ABNORMAL RESULTS OF LIVER FUNCTION STUDIES: ICD-10-CM

## 2021-12-09 PROBLEM — Z86.0101 HISTORY OF ADENOMATOUS POLYP OF COLON: Status: ACTIVE | Noted: 2021-12-09

## 2021-12-09 LAB
ALBUMIN SERPL-MCNC: 3.8 G/DL (ref 3.5–5.2)
ALBUMIN/GLOB SERPL: 1.1 G/DL
ALP SERPL-CCNC: 107 U/L (ref 39–117)
ALT SERPL W P-5'-P-CCNC: 47 U/L (ref 1–33)
ANION GAP SERPL CALCULATED.3IONS-SCNC: 6 MMOL/L (ref 5–15)
AST SERPL-CCNC: 66 U/L (ref 1–32)
BILIRUB SERPL-MCNC: 0.3 MG/DL (ref 0–1.2)
BUN SERPL-MCNC: 16 MG/DL (ref 8–23)
BUN/CREAT SERPL: 25.8 (ref 7–25)
CALCIUM SPEC-SCNC: 9.5 MG/DL (ref 8.6–10.5)
CHLORIDE SERPL-SCNC: 102 MMOL/L (ref 98–107)
CO2 SERPL-SCNC: 32 MMOL/L (ref 22–29)
CREAT SERPL-MCNC: 0.62 MG/DL (ref 0.57–1)
GFR SERPL CREATININE-BSD FRML MDRD: 98 ML/MIN/1.73
GLOBULIN UR ELPH-MCNC: 3.4 GM/DL
GLUCOSE SERPL-MCNC: 232 MG/DL (ref 65–99)
POTASSIUM SERPL-SCNC: 4.5 MMOL/L (ref 3.5–5.2)
PROT SERPL-MCNC: 7.2 G/DL (ref 6–8.5)
SODIUM SERPL-SCNC: 140 MMOL/L (ref 136–145)

## 2021-12-09 PROCEDURE — 80053 COMPREHEN METABOLIC PANEL: CPT | Performed by: NURSE PRACTITIONER

## 2021-12-09 PROCEDURE — 83540 ASSAY OF IRON: CPT | Performed by: NURSE PRACTITIONER

## 2021-12-09 PROCEDURE — 99213 OFFICE O/P EST LOW 20 MIN: CPT | Performed by: NURSE PRACTITIONER

## 2021-12-09 PROCEDURE — 84466 ASSAY OF TRANSFERRIN: CPT | Performed by: NURSE PRACTITIONER

## 2021-12-09 PROCEDURE — 36415 COLL VENOUS BLD VENIPUNCTURE: CPT | Performed by: NURSE PRACTITIONER

## 2021-12-09 PROCEDURE — 80074 ACUTE HEPATITIS PANEL: CPT

## 2021-12-09 PROCEDURE — 82728 ASSAY OF FERRITIN: CPT | Performed by: NURSE PRACTITIONER

## 2021-12-09 NOTE — PROGRESS NOTES
Chief Complaint   Patient presents with   • Colonoscopy     12-21-16 had at [facility]   • Endoscopy     12-21-16 had at [facility]       PCP: Darrin Murphy APRN  REFER: No ref. provider found    Subjective     HPI    Alexandria Rizo is a 61 y.o. female who presents to office for preventative maintenance.  There is  a personal history of colon polyps.  Currently There is not a history of colon cancer.  She does not have complaints of nausea/vomiting, change in bowels, weight loss, no BRBPR, no melena. In September she experienced severe RUQ abdominal pain associated emesis after eating chinese food.  She has experienced this 3 times after eating chinese food 3 times over several years.   There is not a family history of colon cancer in first degree relative.  There is not a family history of colon polyps in first degree relative.  Her last colonoscopy-2016 .  Bowels do move on regular basis.    Alexandria Rizo reports she was notified by PCP liver enzymes elevated in Oct 2021.  She has been told enzymes elevated many years ago.  Half brother had cirrhosis.  Alexandria Rizo consumes occasional alcohol.  No new medications or supplement use.     AST-75, ALT-70, t bili 0.5 (labs brought in by Alexandria Rizo)      [facility] ([facility]) 2016-diverticulosis    CScope (Dr Nicole) 2014-tubular adenoma     Endoscopy (Dr Nicole) 2014  EGD - 2016      Past Medical History:   Diagnosis Date   • Allergic rhinitis    • Arthritis    • Asthma    • Chronic rhinitis    • Depression    • Deviated nasal septum    • Diabetes (HCC)    • Diverticulosis of colon    • Dizziness    • GERD (gastroesophageal reflux disease)    • High cholesterol    • History of stomach ulcers    • Hypertrophy of nasal turbinates    • Obstructive sleep apnea    • Osteoporosis    • Peptic ulcer    • Polyposis of colon    • Sensorineural hearing loss    • Tinnitus      Past Surgical History:   Procedure Laterality Date   • APPENDECTOMY      • BREAST BIOPSY  1996   • BREAST BIOPSY Right 06/04/2012   • CARDIAC CATHETERIZATION     • CARPAL TUNNEL RELEASE Bilateral 02/2004   • CAUTERIZATION NASAL BLEEDERS  01/2016   • CHOLECYSTECTOMY     • COLON SURGERY     • COLONOSCOPY     • DILATATION AND CURETTAGE  1986   • ENDOSCOPY     • HERNIA REPAIR  2006   • HYSTERECTOMY     • NASAL RECONSTRUCTION  2016   • SHOULDER SURGERY Right 12/2003    arthroscopy   • TONSILLECTOMY  1968   • TURBINOPLASTY       Outpatient Medications Marked as Taking for the 12/9/21 encounter (Office Visit) with Seb Cortes APRN   Medication Sig Dispense Refill   • albuterol (PROVENTIL HFA;VENTOLIN HFA) 108 (90 BASE) MCG/ACT inhaler Inhale 2 puffs Every 4 (Four) Hours As Needed for Wheezing.     • dextromethorphan-guaifenesin (MUCINEX DM)  MG per 12 hr tablet Take 1 tablet by mouth As Needed.     • Diclofenac Sodium (VOLTAREN) 1 % gel gel Apply 4 g topically to the appropriate area as directed 4 (Four) Times a Day As Needed.     • diphenhydrAMINE (BENADRYL) 25 MG tablet Every 6 (Six) Hours.     • levocetirizine (XYZAL) 5 MG tablet      • meclizine (ANTIVERT) 25 MG tablet Take 1 tablet by mouth As Needed.     • montelukast (SINGULAIR) 10 MG tablet TAKE ONE TABLET BY MOUTH DAILY 90 tablet 3   • Naproxen Sodium 220 MG capsule Take by mouth     • ondansetron ODT (ZOFRAN-ODT) 8 MG disintegrating tablet Every 8 (Eight) Hours.     • pantoprazole (PROTONIX) 40 MG EC tablet Take 40 mg by mouth daily.       • polyethyl glycol-propyl glycol (SYSTANE) 0.4-0.3 % solution ophthalmic solution Administer 2 drops to both eyes 2 (Two) Times a Day.     • potassium chloride (K-DUR,KLOR-CON) 20 MEQ CR tablet Take 1 tablet by mouth Daily.     • promethazine (PHENERGAN) 25 MG tablet Take 25 mg by mouth Every 6 (Six) Hours As Needed for Nausea or Vomiting.       Allergies   Allergen Reactions   • Advair Diskus [Fluticasone-Salmeterol] Shortness Of Breath   • Albuterol Shortness Of Breath   •  Albuterol Sulfate Shortness Of Breath     Throat closes.   • Beclomethasone Shortness Of Breath   • Lasix [Furosemide] Shortness Of Breath   • Levaquin [Levofloxacin] Shortness Of Breath     Chest pain.   • Lyrica [Pregabalin] Shortness Of Breath   • Nasonex [Mometasone Furoate] Shortness Of Breath   • Simvastatin Other (See Comments)     Mouth drawing, slurred speech, headaches.   • Spiriva Handihaler [Tiotropium Bromide Monohydrate] Shortness Of Breath   • Sulfa Antibiotics Shortness Of Breath     Mouth swelling.  Mouth swelling.   • Symbicort [Budesonide-Formoterol Fumarate] Shortness Of Breath   • Topamax [Topiramate] Shortness Of Breath   • Zithromax [Azithromycin] Shortness Of Breath   • Aspergillus (Mixed) Allergy Skin Test Dermatitis   • Ampicillin Other (See Comments)     Patient unsure of reaction.   • Cymbalta [Duloxetine Hcl] Mental Status Change   • Dicyclomine Other (See Comments)     Depression.   • Medrol [Methylprednisolone] Rash     Social History     Socioeconomic History   • Marital status:    Tobacco Use   • Smoking status: Never Smoker   • Smokeless tobacco: Never Used   Substance and Sexual Activity   • Alcohol use: Yes     Comment: socially   • Drug use: Never     Review of Systems   Constitutional: Negative for unexpected weight change.   Respiratory: Negative for shortness of breath.    Cardiovascular: Negative for chest pain.   Gastrointestinal: Negative for abdominal pain and anal bleeding.     Objective   Vitals:    12/09/21 1251   BP: 160/88   Pulse: 74   Temp: 98 °F (36.7 °C)   SpO2: 98%     Physical Exam  Constitutional:       Appearance: Normal appearance. She is well-developed.   Eyes:      General: No scleral icterus.  Cardiovascular:      Rate and Rhythm: Regular rhythm.      Heart sounds: Normal heart sounds. No murmur heard.      Pulmonary:      Effort: Pulmonary effort is normal. No accessory muscle usage.      Breath sounds: Normal breath sounds.   Abdominal:       General: Bowel sounds are normal. There is no distension.      Palpations: Abdomen is soft. There is no mass.      Tenderness: There is no abdominal tenderness. There is no guarding or rebound.   Skin:     General: Skin is warm and dry.      Coloration: Skin is not jaundiced.   Neurological:      Mental Status: She is alert.   Psychiatric:         Behavior: Behavior is cooperative.       Imaging Results (Most Recent)     None        Body mass index is 47.94 kg/m².    Assessment/Plan   Diagnoses and all orders for this visit:    1. History of adenomatous polyp of colon (Primary)  -     Case Request; Standing  -     Case Request    2. Elevated liver enzymes  -     Hepatitis Panel, Acute; Future  -     Ferritin  -     Comprehensive Metabolic Panel  -     Iron Profile  -     US Liver; Future  -     US Elastography Parenchyma; Future  -     Comprehensive Metabolic Panel; Future    3. Abnormal results of liver function studies   -     Hepatitis Panel, Acute; Future      COLONOSCOPY WITH ANESTHESIA (N/A)    Miralax prep   CMP prior to office visit in 3 months    Advised pt to stop use of NSAIDs, Fish Oil, and MV 5 days prior to procedure, per Dr Nicole protocol.  Tylenol based products are ok to take.  Pt verbalized understanding.     All risks, benefits, alternatives, and indications of colonoscopy procedure have been discussed with the patient. Risks to include perforation of the colon requiring possible surgery or colostomy, risk of bleeding from biopsies or removal of colon tissue, possibility of missing a colon polyp or cancer, or adverse drug reaction.  Benefits to include the diagnosis and management of disease of the colon and rectum. Alternatives to include barium enema, radiographic evaluation, lab testing or no intervention. She verbalizes understanding and agrees.     I discussed how fatty liver can lead to cirrhosis, disability and pre-mature death.  How it also can be a sign of increased risk for  cardiovascular disease.  I discussed the importance of getting rid of fat in the liver by controlling lipids and glucose, avoiding etoh helps, and gradual weight loss to ideal body weight is very important.     I offered referral to bariatric surgery, Alexandria Rizo declined.  She wishes to attempt dietary changes and weight loss on her own first.  She will follow up in 3 months with lab work prior to office visit. (apx 3/7/22)      Seb Cortes, APRN  12/09/21        There are no Patient Instructions on file for this visit.

## 2021-12-10 LAB
FERRITIN SERPL-MCNC: 114 NG/ML (ref 13–150)
HAV IGM SERPL QL IA: NORMAL
HBV CORE IGM SERPL QL IA: NORMAL
HBV SURFACE AG SERPL QL IA: NORMAL
HCV AB SER DONR QL: NORMAL
IRON 24H UR-MRATE: 79 MCG/DL (ref 37–145)
IRON SATN MFR SERPL: 19 % (ref 20–50)
TIBC SERPL-MCNC: 420 MCG/DL (ref 298–536)
TRANSFERRIN SERPL-MCNC: 282 MG/DL (ref 200–360)

## 2021-12-14 ENCOUNTER — TELEPHONE (OUTPATIENT)
Dept: GASTROENTEROLOGY | Facility: CLINIC | Age: 61
End: 2021-12-14

## 2021-12-14 NOTE — TELEPHONE ENCOUNTER
----- Message from JAMAL Gatica sent at 12/14/2021 12:25 PM CST -----  Please let her know lab work thus far has been unremarkable    Further recommendation pending US and other lab work

## 2021-12-17 ENCOUNTER — HOSPITAL ENCOUNTER (OUTPATIENT)
Dept: ULTRASOUND IMAGING | Facility: HOSPITAL | Age: 61
Discharge: HOME | End: 2021-12-17

## 2021-12-17 DIAGNOSIS — R74.8 ELEVATED LIVER ENZYMES: ICD-10-CM

## 2021-12-17 PROCEDURE — 76705 ECHO EXAM OF ABDOMEN: CPT

## 2021-12-17 PROCEDURE — 76981 USE PARENCHYMA: CPT

## 2021-12-27 ENCOUNTER — TELEPHONE (OUTPATIENT)
Dept: GASTROENTEROLOGY | Facility: CLINIC | Age: 61
End: 2021-12-27

## 2022-01-14 ENCOUNTER — TELEPHONE (OUTPATIENT)
Dept: GASTROENTEROLOGY | Facility: CLINIC | Age: 62
End: 2022-01-14

## 2022-01-14 NOTE — TELEPHONE ENCOUNTER
----- Message from JAMAL Gatica sent at 1/13/2022 11:10 AM CST -----  Please let her know US showed liver to have smooth appearance, not indicative of cirrhosis.  No signs of mass to liver.   Dr Nicole will discuss results further at time of colonoscopy.  I encourage her to keep current scheduled follow up appointment with me in march (CMP prior to visit)

## 2022-01-24 ENCOUNTER — TELEPHONE (OUTPATIENT)
Dept: GASTROENTEROLOGY | Facility: CLINIC | Age: 62
End: 2022-01-24

## 2022-01-24 NOTE — TELEPHONE ENCOUNTER
No - Sent letter to pcp. Patient cancel because to much Covid going around. - Elaine spoke with her       ===View-only below this line===  ----- Message -----  From: Seb Cortes APRN  Sent: 1/24/2022  11:33 AM CST  To: Chao Edwards MA    Did denise Rizo 1960 reschedule colonoscopy?    Ad

## 2022-01-24 NOTE — TELEPHONE ENCOUNTER
Colonoscopy cancelled due to covid concerns    Dr Nicole Had planned on discussing ultrasound results with her today,    She has appointment in March for office visit   Will plan on reviewing ultrasound at that time

## 2022-02-25 ENCOUNTER — LAB (OUTPATIENT)
Dept: LAB | Facility: HOSPITAL | Age: 62
End: 2022-02-25

## 2022-02-25 ENCOUNTER — OFFICE VISIT (OUTPATIENT)
Dept: PULMONOLOGY | Facility: CLINIC | Age: 62
End: 2022-02-25

## 2022-02-25 VITALS
WEIGHT: 293 LBS | DIASTOLIC BLOOD PRESSURE: 80 MMHG | BODY MASS INDEX: 47.09 KG/M2 | SYSTOLIC BLOOD PRESSURE: 148 MMHG | HEART RATE: 98 BPM | OXYGEN SATURATION: 95 % | HEIGHT: 66 IN

## 2022-02-25 DIAGNOSIS — G47.33 OSA TREATED WITH BIPAP: ICD-10-CM

## 2022-02-25 DIAGNOSIS — E66.01 MORBID OBESITY: ICD-10-CM

## 2022-02-25 DIAGNOSIS — R74.8 ELEVATED LIVER ENZYMES: ICD-10-CM

## 2022-02-25 DIAGNOSIS — J45.20 MILD INTERMITTENT ASTHMA WITHOUT COMPLICATION: Primary | ICD-10-CM

## 2022-02-25 LAB
ALBUMIN SERPL-MCNC: 3.8 G/DL (ref 3.5–5.2)
ALBUMIN/GLOB SERPL: 1.3 G/DL
ALP SERPL-CCNC: 109 U/L (ref 39–117)
ALT SERPL W P-5'-P-CCNC: 44 U/L (ref 1–33)
ANION GAP SERPL CALCULATED.3IONS-SCNC: 11.1 MMOL/L (ref 5–15)
AST SERPL-CCNC: 52 U/L (ref 1–32)
BILIRUB SERPL-MCNC: 0.2 MG/DL (ref 0–1.2)
BUN SERPL-MCNC: 15 MG/DL (ref 8–23)
BUN/CREAT SERPL: 20.5 (ref 7–25)
CALCIUM SPEC-SCNC: 9.5 MG/DL (ref 8.6–10.5)
CHLORIDE SERPL-SCNC: 103 MMOL/L (ref 98–107)
CO2 SERPL-SCNC: 28.9 MMOL/L (ref 22–29)
CREAT SERPL-MCNC: 0.73 MG/DL (ref 0.57–1)
GFR SERPL CREATININE-BSD FRML MDRD: 81 ML/MIN/1.73
GLOBULIN UR ELPH-MCNC: 3 GM/DL
GLUCOSE SERPL-MCNC: 165 MG/DL (ref 65–99)
POTASSIUM SERPL-SCNC: 4.7 MMOL/L (ref 3.5–5.2)
PROT SERPL-MCNC: 6.8 G/DL (ref 6–8.5)
SODIUM SERPL-SCNC: 143 MMOL/L (ref 136–145)

## 2022-02-25 PROCEDURE — 36415 COLL VENOUS BLD VENIPUNCTURE: CPT

## 2022-02-25 PROCEDURE — 80053 COMPREHEN METABOLIC PANEL: CPT

## 2022-02-25 PROCEDURE — 99214 OFFICE O/P EST MOD 30 MIN: CPT | Performed by: INTERNAL MEDICINE

## 2022-02-25 RX ORDER — ZINC 25 MG
25 TABLET ORAL DAILY
COMMUNITY

## 2022-02-25 NOTE — PROGRESS NOTES
Chief Complaint  mild intermittent asthma and Sleep Apnea    Subjective    History of Present Illness     Alexandria Rizo presents to [facility] for asthma and sleep apnea.    History of Present Illness   The patient is doing well from the standpoint of her asthma.  She is not on any inhalers at this time as she has had issues with adverse reaction to multiple inhaled medications.  She is on montelukast and this seems to be controlling her symptoms.  I did tell her she had worsening asthma symptoms particularly if she required systemic steroids then she might be candidate for consideration of biologic therapy but I do not think that is necessary at present.  I discussed follow-up pulmonary functions with her and she states is all she is stable she prefer to hold off.  She is on BiPAP for sleep apnea and a recent compliance report shows excellent compliance with acceptable leak and also excellent control of her apneas with an AHI 1.5.  Despite this she has a difficult time tolerating the device and wakes up with her mouth feeling very dry even though she uses a humidification system.  She inquired about other options for treatment particularly the Inspire system.  Based on her BMI I doubt if she would be a candidate for this system but I told her I am attending a conference on this device in a few weeks and afterwards I will contact her regarding further information in this regard.  She declines a flu shot.  She also has not had a COVID-19 vaccine.  I encouraged her to have it administered.  Prior to Admission medications    Medication Sig Start Date End Date Taking? Authorizing Provider   albuterol (PROVENTIL HFA;VENTOLIN HFA) 108 (90 BASE) MCG/ACT inhaler Inhale 2 puffs Every 4 (Four) Hours As Needed for Wheezing.   Yes Berkley Tomlin MD   bumetanide (BUMEX) 2 MG tablet Take 2 mg by mouth As Needed.   Yes Berkley Tomlin MD    dextromethorphan-guaifenesin (MUCINEX DM)  MG per 12 hr tablet Take 1 tablet by mouth As Needed.   Yes Berkley Tomlin MD   Diclofenac Sodium (VOLTAREN) 1 % gel gel Apply 4 g topically to the appropriate area as directed 4 (Four) Times a Day As Needed.   Yes Berkley Tomlin MD   diphenhydrAMINE (BENADRYL) 25 MG tablet Every 6 (Six) Hours.   Yes Berkley Tomlin MD   EPINEPHrine (EPIPEN JR) 0.15 MG/0.3ML solution auto-injector injection Inject as directed   Yes Berkley Tomlin MD   levocetirizine (XYZAL) 5 MG tablet  6/17/19  Yes Berkley Tomlin MD   meclizine (ANTIVERT) 25 MG tablet Take 1 tablet by mouth As Needed. 8/3/18  Yes Berkley Tomlin MD   montelukast (SINGULAIR) 10 MG tablet TAKE ONE TABLET BY MOUTH DAILY 10/15/20  Yes Shahrzad Hirsch APRN   Naproxen Sodium 220 MG capsule Take by mouth   Yes Berkley Tomlin MD   ondansetron ODT (ZOFRAN-ODT) 8 MG disintegrating tablet Every 8 (Eight) Hours.   Yes Berkley Tomlin MD   pantoprazole (PROTONIX) 40 MG EC tablet Take 40 mg by mouth daily.     Yes Berkley Tomlin MD   polyethyl glycol-propyl glycol (SYSTANE) 0.4-0.3 % solution ophthalmic solution Administer 2 drops to both eyes 2 (Two) Times a Day.   Yes Berkley Tomlin MD   potassium chloride (K-DUR,KLOR-CON) 20 MEQ CR tablet Take 1 tablet by mouth Daily.   Yes Berkley Tomlin MD   promethazine (PHENERGAN) 25 MG tablet Take 25 mg by mouth Every 6 (Six) Hours As Needed for Nausea or Vomiting.   Yes Berkley Tomlin MD   Zinc 25 MG tablet Take 25 mg by mouth Daily.   Yes Berkley Tomlin MD   aspirin 81 MG chewable tablet Chew 81 mg Daily.    Berkley Tomlin MD   Ergocalciferol (VITAMIN D2 PO) 1.25 mg 1 (One) Time Per Week. 1/2/20   Berkley Tomlin MD       Social History     Socioeconomic History   • Marital status:    Tobacco Use   • Smoking status: Never Smoker   • Smokeless tobacco: Never Used   Substance  "and Sexual Activity   • Alcohol use: Yes     Comment: socially   • Drug use: Never       Objective   Vital Signs:   /80   Pulse 98   Ht 167.6 cm (66\")   Wt 135 kg (297 lb 9.6 oz)   SpO2 95% Comment: ra  BMI 48.03 kg/m²     Physical Exam  Vitals and nursing note reviewed.   Constitutional:       Appearance: She is obese.   HENT:      Head: Normocephalic.      Comments: She is wearing a mask.  Eyes:      Extraocular Movements: Extraocular movements intact.      Pupils: Pupils are equal, round, and reactive to light.   Neck:      Comments: Her neck is thick.  Cardiovascular:      Rate and Rhythm: Normal rate and regular rhythm.   Pulmonary:      Effort: Pulmonary effort is normal.      Breath sounds: Normal breath sounds.   Abdominal:      Comments: Her abdomen is obese.   Musculoskeletal:         General: Normal range of motion.   Skin:     General: Skin is warm and dry.   Neurological:      General: No focal deficit present.      Mental Status: She is alert and oriented to person, place, and time.   Psychiatric:         Mood and Affect: Mood normal.         Behavior: Behavior normal.        Result Review :          Results for orders placed in visit on 08/19/19    Pulmonary Function Test                 My interpretation of labs:   IgE (08/19/2019 15:01)      Assessment and Plan     Diagnoses and all orders for this visit:    1. Mild intermittent asthma without complication (Primary)  Assessment & Plan:  Her asthma is stable at present and she may continue her montelukast therapy.  She currently is not utilizing any inhalers.          2. ADAM treated with BiPAP  Assessment & Plan:  A recent compliance study showed excellent compliance with an acceptable level of leak and also excellent control of her apneas.  Despite this she is having a difficult time tolerating the device.  I do not think she will be a candidate for the Inspire system due to her BMI but I will investigate this further and contact her once " I have done so after again attending the conference on this device in a few weeks.      3. Morbid obesity (HCC)  Assessment & Plan:  Diet and exercise are encouraged.  She will follow up with her primary healthcare provider regarding her elevated BMI otherwise.        Patient's Body mass index is 48.03 kg/m². indicating that she is morbidly obese (BMI > 40 or > 35 with obesity - related health condition). Obesity-related health conditions include the following: obstructive sleep apnea.  Diet and exercise are encouraged and she will follow-up with her primary healthcare provider regarding her elevated BMI otherwise.        Nicolás Starr MD  2/25/2022  10:48 CST    Follow Up   Return in about 1 year (around 2/25/2023) for To see me specifically.    Patient was given instructions and counseling regarding her condition or for health maintenance advice. Please see specific information pulled into the AVS if appropriate.  Again I will call her after I have obtained more information on the Inspire device for sleep apnea.  Unfortunately based on her BMI she likely will not be a candidate for the device.  If she has any flares of her asthma particularly if they require systemic steroids we might consider biologic therapy particularly with her history of allergies.  If she has increasing asthma symptoms I told her to contact the office and I will see her back prior to her routine appointment in 1 year and will get PFTs.  She would prefer to hold off on PFTs unless she has increasing symptoms and I told her that was fine.  Again I will see her back in 1 year unless she has acute problems with her asthma in the interim and I will contact her once I have more information on the Inspire device for sleep apnea.

## 2022-02-25 NOTE — ASSESSMENT & PLAN NOTE
Diet and exercise are encouraged.  She will follow up with her primary healthcare provider regarding her elevated BMI otherwise.

## 2022-02-25 NOTE — ASSESSMENT & PLAN NOTE
A recent compliance study showed excellent compliance with an acceptable level of leak and also excellent control of her apneas.  Despite this she is having a difficult time tolerating the device.  I do not think she will be a candidate for the Inspire system due to her BMI but I will investigate this further and contact her once I have done so after again attending the conference on this device in a few weeks.

## 2022-02-25 NOTE — PATIENT INSTRUCTIONS
The patient is doing reasonably well from the standpoint of her asthma on her montelukast therapy.  If she were to have flares of her asthma requiring steroids systemically then we could consider evaluation for biologic therapy at some point.  She also inquired about the Inspire system for sleep apnea and I told her I will be attending a conference on this in the next few weeks and I will contact her with has some information on the device shortly thereafter.  Otherwise I will see her back in 1 year.  If she has increasing asthma symptomatology I told her to contact the office and I can see her back sooner with pulmonary functions in particular.

## 2022-02-25 NOTE — ASSESSMENT & PLAN NOTE
Her asthma is stable at present and she may continue her montelukast therapy.  She currently is not utilizing any inhalers.

## 2023-02-24 ENCOUNTER — OFFICE VISIT (OUTPATIENT)
Dept: PULMONOLOGY | Facility: CLINIC | Age: 63
End: 2023-02-24
Payer: MEDICARE

## 2023-02-24 VITALS
HEIGHT: 66 IN | OXYGEN SATURATION: 99 % | SYSTOLIC BLOOD PRESSURE: 136 MMHG | DIASTOLIC BLOOD PRESSURE: 82 MMHG | BODY MASS INDEX: 46.12 KG/M2 | HEART RATE: 75 BPM | WEIGHT: 287 LBS

## 2023-02-24 DIAGNOSIS — G47.33 OSA TREATED WITH BIPAP: Primary | ICD-10-CM

## 2023-02-24 DIAGNOSIS — J45.20 MILD INTERMITTENT ASTHMA WITHOUT COMPLICATION: ICD-10-CM

## 2023-02-24 DIAGNOSIS — E66.01 MORBID OBESITY: ICD-10-CM

## 2023-02-24 PROCEDURE — 99214 OFFICE O/P EST MOD 30 MIN: CPT | Performed by: INTERNAL MEDICINE

## 2023-02-24 RX ORDER — MULTIVITAMIN WITH IRON
TABLET ORAL
COMMUNITY
Start: 2023-01-25

## 2023-02-24 RX ORDER — MULTIPLE VITAMINS W/ MINERALS TAB 9MG-400MCG
1 TAB ORAL DAILY
COMMUNITY

## 2023-02-24 RX ORDER — MULTIPLE VITAMINS W/ MINERALS TAB 9MG-400MCG
TAB ORAL
COMMUNITY
Start: 2023-01-01

## 2023-02-24 NOTE — PATIENT INSTRUCTIONS
The patient does use her CPAP on a regular basis but is not sure if it is benefiting her that much.  I did tell her that if she did utilize it less frequently and stopped it it may not have any health consequences.  I would still advise her to try to continue it but again in the absence of significant cardiopulmonary disease stopping the device may not have any significant health consequences.  I will see her back in 1 year otherwise.

## 2023-02-25 NOTE — ASSESSMENT & PLAN NOTE
Patient's (Body mass index is 46.32 kg/m².) indicates that they are morbidly/severely obese (BMI > 40 or > 35 with obesity - related health condition) with health conditions that include obstructive sleep apnea . Weight is improving with lifestyle modifications.  Diet and exercise are encouraged and she will follow-up with her primary healthcare provider regarding her elevated BMI otherwise

## 2023-02-25 NOTE — PROGRESS NOTES
Chief Complaint  Mild intermittent asthma without complication and Sleep Apnea    Subjective    History of Present Illness     Alexandria Rizo presents to [facility] MEDICINE for asthma and sleep apnea.    History of Present Illness   The patient is doing very well from the standpoint of her asthma.  She is aware that she is not a candidate for the inspire device based on her BMI.  She states that she still has excessive daytime sleepiness despite wearing her BiPAP and is considering not wearing it in the future.  Previous downloads have shown good control of her apneas and good compliance and I will try to get a follow-up download and I did tell her that I will certainly recommend she continue the BiPAP but in terms of any adverse medical effects of stopping it other than possibly worsening her daytime somnolence there is not a tremendous amount of evidence to support this unless she had something such as significant cardiac disease, cardiac arrhythmias, previous stroke, or severe chronic lung disease with hypoxemia in particular.  Again I encouraged her to continue device but she may not do so.  She is doing well from the standpoint of her asthma.  Prior to Admission medications    Medication Sig Start Date End Date Taking? Authorizing Provider   albuterol (PROVENTIL HFA;VENTOLIN HFA) 108 (90 BASE) MCG/ACT inhaler Inhale 2 puffs Every 4 (Four) Hours As Needed for Wheezing.   Yes Berkley Tomlin MD   bumetanide (BUMEX) 2 MG tablet Take 2 mg by mouth As Needed.   Yes Berlkey Tomlin MD   Calcium Carb-Cholecalciferol (CALCIUM 600 + D PO) Take  by mouth.   Yes Berkley Tomlin MD   COLLAGEN PO Take  by mouth.   Yes Berkley Tomlin MD   dextromethorphan-guaifenesin (MUCINEX DM)  MG per 12 hr tablet Take 1 tablet by mouth As Needed.   Yes Berkley Tomlin MD   Diclofenac Sodium (VOLTAREN) 1 % gel gel Apply 4 g topically to the appropriate area  as directed 4 (Four) Times a Day As Needed.   Yes Berkley Tomlin MD   diphenhydrAMINE (BENADRYL) 25 MG tablet Every 6 (Six) Hours.   Yes Berkley Tomlin MD   EPINEPHrine (EPIPEN JR) 0.15 MG/0.3ML solution auto-injector injection Inject as directed   Yes Berkley Tomlin MD   Glucos-Chond-Sterol-Fish Oil (GLUCOSAMINE CHONDROITIN PLUS PO) Take  by mouth.   Yes Berkley Tomlin MD   Hylan (SYNVISC ONE IX) Inject  into the appropriate joint as directed by provider.   Yes Berkley Tomlin MD   levocetirizine (XYZAL) 5 MG tablet  6/17/19  Yes Berkley Tomlin MD   Magnesium 250 MG tablet  1/25/23  Yes Berkley Tomlin MD   meclizine (ANTIVERT) 25 MG tablet Take 1 tablet by mouth As Needed. 8/3/18  Yes Berkley Tomlin MD   montelukast (SINGULAIR) 10 MG tablet TAKE ONE TABLET BY MOUTH DAILY 10/15/20  Yes Shahrzad Hirsch APRN   multivitamin with minerals (HAIR SKIN AND NAILS FORMULA PO) Take 1 tablet by mouth Daily.   Yes Berkley Tomlin MD   multivitamin with minerals tablet tablet  1/1/23  Yes Berkley Tomlin MD   Naproxen Sodium 220 MG capsule Take by mouth   Yes Berkley Tomlin MD   pantoprazole (PROTONIX) 40 MG EC tablet Take 40 mg by mouth daily.     Yes Berkley Tomlin MD   polyethyl glycol-propyl glycol (SYSTANE) 0.4-0.3 % solution ophthalmic solution Administer 2 drops to both eyes 2 (Two) Times a Day.   Yes Berkley Tomlin MD   potassium chloride (K-DUR,KLOR-CON) 20 MEQ CR tablet Take 1 tablet by mouth Daily.   Yes Berkley Tomlin MD   promethazine (PHENERGAN) 25 MG tablet Take 25 mg by mouth Every 6 (Six) Hours As Needed for Nausea or Vomiting.   Yes Berkley Tomlin MD   aspirin 81 MG chewable tablet Chew 81 mg Daily.    Berkley Tomlin MD   Ergocalciferol (VITAMIN D2 PO) 1.25 mg 1 (One) Time Per Week. 1/2/20   Berkley Tomlin MD   ondansetron ODT (ZOFRAN-ODT) 8 MG disintegrating tablet Every 8 (Eight) Hours.     "Provider, MD Berkley   Zinc 25 MG tablet Take 25 mg by mouth Daily.    Provider, MD Berkley       Social History     Socioeconomic History   • Marital status:    Tobacco Use   • Smoking status: Never   • Smokeless tobacco: Never   Substance and Sexual Activity   • Alcohol use: Yes     Alcohol/week: 1.0 standard drink     Types: 1 Shots of liquor per week     Comment: Occasionally   • Drug use: Never   • Sexual activity: Yes     Partners: Male     Birth control/protection: Post-menopausal, None       Objective   Vital Signs:   /82   Pulse 75   Ht 167.6 cm (66\")   Wt 130 kg (287 lb)   SpO2 99% Comment: RA  BMI 46.32 kg/m²     Physical Exam  Vitals and nursing note reviewed.   Constitutional:       Appearance: She is obese.   HENT:      Head: Normocephalic.      Comments: She is wearing a mask.  Eyes:      Extraocular Movements: Extraocular movements intact.      Pupils: Pupils are equal, round, and reactive to light.   Neck:      Comments: Her neck is thick.  Cardiovascular:      Rate and Rhythm: Normal rate and regular rhythm.   Pulmonary:      Effort: Pulmonary effort is normal.      Breath sounds: Normal breath sounds.   Musculoskeletal:         General: Normal range of motion.   Skin:     General: Skin is warm and dry.   Neurological:      General: No focal deficit present.      Mental Status: She is alert and oriented to person, place, and time.   Psychiatric:         Mood and Affect: Mood normal.         Behavior: Behavior normal.        Result Review :          Results for orders placed in visit on 08/19/19    Pulmonary Function Test                 My interpretation of compliance download:  DURABLE MEDICAL EQUIPMENT - SCAN - PAP COMPLIANCE [id number]/26/20 THRU 02/23/21 (02/23/2021)          Assessment and Plan     Diagnoses and all orders for this visit:    1. ADAM treated with BiPAP (Primary)  Assessment & Plan:  I again encouraged her to continue her BiPAP but she may not do " so.      2. Mild intermittent asthma without complication  Assessment & Plan:  Her asthma is under good control at present and she may continue her as needed albuterol HFA.      3. Morbid obesity (HCC)  Assessment & Plan:  Patient's (Body mass index is 46.32 kg/m².) indicates that they are morbidly/severely obese (BMI > 40 or > 35 with obesity - related health condition) with health conditions that include obstructive sleep apnea . Weight is improving with lifestyle modifications.  Diet and exercise are encouraged and she will follow-up with her primary healthcare provider regarding her elevated BMI otherwise          Nicolás Starr MD  2/24/2023  21:23 CST    Follow Up   Return in about 1 year (around 2/24/2024) for To see me specifically.    Patient was given instructions and counseling regarding her condition or for health maintenance advice. Please see specific information pulled into the AVS if appropriate.

## 2024-02-26 ENCOUNTER — OFFICE VISIT (OUTPATIENT)
Dept: PULMONOLOGY | Facility: CLINIC | Age: 64
End: 2024-02-26
Payer: MEDICARE

## 2024-02-26 VITALS
SYSTOLIC BLOOD PRESSURE: 146 MMHG | HEIGHT: 66 IN | DIASTOLIC BLOOD PRESSURE: 92 MMHG | HEART RATE: 72 BPM | BODY MASS INDEX: 45.32 KG/M2 | OXYGEN SATURATION: 96 % | WEIGHT: 282 LBS

## 2024-02-26 DIAGNOSIS — G47.33 OSA TREATED WITH BIPAP: Primary | Chronic | ICD-10-CM

## 2024-02-26 DIAGNOSIS — E66.01 MORBID OBESITY: Chronic | ICD-10-CM

## 2024-02-26 DIAGNOSIS — J45.20 MILD INTERMITTENT ASTHMA WITHOUT COMPLICATION: Chronic | ICD-10-CM

## 2024-02-26 PROCEDURE — 1159F MED LIST DOCD IN RCRD: CPT | Performed by: INTERNAL MEDICINE

## 2024-02-26 PROCEDURE — 1160F RVW MEDS BY RX/DR IN RCRD: CPT | Performed by: INTERNAL MEDICINE

## 2024-02-26 PROCEDURE — 99214 OFFICE O/P EST MOD 30 MIN: CPT | Performed by: INTERNAL MEDICINE

## 2024-02-26 NOTE — ASSESSMENT & PLAN NOTE
Her recent compliance download showed excellent compliance with excellent control of her apneas and no significant leak.  I reviewed the data with her and her daughter who accompanies her today.  I did provide a prescription for a new BiPAP device as well.

## 2024-02-26 NOTE — ASSESSMENT & PLAN NOTE
She is having no issues with her asthma at present and may continue her montelukast and albuterol HFA as needed.

## 2024-02-26 NOTE — PATIENT INSTRUCTIONS
The patient is doing very well on her current BiPAP device.  A recent download revealed excellent compliance with excellent control of her apneas and no significant leak.  I will plan a follow-up visit in 1 year with pulmonary functions although if she is doing well wants to hold off on those that is fine.

## 2024-02-26 NOTE — ASSESSMENT & PLAN NOTE
Patient's (Body mass index is 45.54 kg/m².) indicates that they are morbidly/severely obese (BMI > 40 or > 35 with obesity - related health condition) with health conditions that include obstructive sleep apnea . Weight is unchanged.  Diet and exercise are encouraged and she will follow-up with her primary healthcare provider regarding her elevated BMI otherwise.

## 2024-02-26 NOTE — PROGRESS NOTES
Chief Complaint  Sleep Apnea and Asthma    Subjective    History of Present Illness {CC  Problem List  Visit Diagnosis   Encounters  Notes  Medications  Labs  Result Review Imaging  Media: 23}    Alexandria Rizo presents to [facility] for sleep apnea and asthma.    History of Present Illness   The patient is accompanied by her daughter today.  She is doing very well with her BiPAP device but does need a prescription for a new machine.  I did review her recent compliance data with her and she had excellent compliance with excellent control of her apneas and no significant leak.  She is also doing well from the standpoint of her asthma.  I did tell her we can get pulmonary functions at some point but she is in agreement with holding off for now but I will schedule those to be performed she returns next year.  She is just using her albuterol HFA as needed at present.  She has had the Prevnar 13 and the Pneumovax.  She is refused the flu shot in the past.  Prior to Admission medications    Medication Sig Start Date End Date Taking? Authorizing Provider   albuterol (PROVENTIL HFA;VENTOLIN HFA) 108 (90 BASE) MCG/ACT inhaler Inhale 2 puffs Every 4 (Four) Hours As Needed for Wheezing.   Yes Berkley Tomlin MD   bumetanide (BUMEX) 2 MG tablet Take 1 tablet by mouth As Needed.   Yes Berkley Tomlin MD   Calcium Carb-Cholecalciferol (CALCIUM 600 + D PO) Take  by mouth.   Yes Berkley Tomlin MD   dextromethorphan-guaifenesin (MUCINEX DM)  MG per 12 hr tablet Take 1 tablet by mouth As Needed.   Yes Berkley Tomlin MD   Diclofenac Sodium (VOLTAREN) 1 % gel gel Apply 4 g topically to the appropriate area as directed 4 (Four) Times a Day As Needed.   Yes Berkley Tomlin MD   diphenhydrAMINE (BENADRYL) 25 MG tablet Every 6 (Six) Hours.   Yes Berkley Tomlin MD   EPINEPHrine (EPIPEN JR) 0.15 MG/0.3ML solution auto-injector  injection Inject as directed   Yes Berkley Tomlin MD   Ergocalciferol (VITAMIN D2 PO) 1.25 mg Daily. 1/2/20  Yes Berkley Tomlin MD   Magnesium 250 MG tablet  1/25/23  Yes Berkley Tomlin MD   meclizine (ANTIVERT) 25 MG tablet Take 1 tablet by mouth As Needed. 8/3/18  Yes Berkley Tomlin MD   montelukast (SINGULAIR) 10 MG tablet TAKE ONE TABLET BY MOUTH DAILY 10/15/20  Yes Shahrzad Hirsch APRN   multivitamin with minerals (HAIR SKIN AND NAILS FORMULA PO) Take 1 tablet by mouth Daily.   Yes Berkley Tomlin MD   multivitamin with minerals tablet tablet  1/1/23  Yes Berkley Tomlin MD   Naproxen Sodium 220 MG capsule Take by mouth   Yes Berkley Tomlin MD   ondansetron ODT (ZOFRAN-ODT) 8 MG disintegrating tablet Every 8 (Eight) Hours.   Yes Berkley Tomlin MD   pantoprazole (PROTONIX) 40 MG EC tablet Take 40 mg by mouth daily.     Yes Berkley Tomlin MD   polyethyl glycol-propyl glycol (SYSTANE) 0.4-0.3 % solution ophthalmic solution Administer 2 drops to both eyes Every 1 (One) Hour As Needed.   Yes Berkley Tomlin MD   potassium chloride (K-DUR,KLOR-CON) 20 MEQ CR tablet Take 1 tablet by mouth Daily.   Yes Berkley Tomlin MD   promethazine (PHENERGAN) 25 MG tablet Take 1 tablet by mouth Every 6 (Six) Hours As Needed for Nausea or Vomiting.   Yes Berkley Tomlin MD   Zinc 25 MG tablet Take 25 mg by mouth Daily.   Yes Berkley Tomlin MD   aspirin 81 MG chewable tablet Chew 81 mg Daily.  Patient not taking: Reported on 2/26/2024    Berkley Tomlin MD   COLLAGEN PO Take  by mouth.  Patient not taking: Reported on 2/26/2024    Berkley Tomlin MD   Glucos-Chond-Sterol-Fish Oil (GLUCOSAMINE CHONDROITIN PLUS PO) Take  by mouth.  Patient not taking: Reported on 2/26/2024    Berkley Tomlin MD Hylan (SYNVISC ONE IX) Inject  into the appropriate joint as directed by provider.  Patient not taking: Reported on 2/26/2024     "Provider, MD Berkley   levocetirizine (XYZAL) 5 MG tablet  6/17/19   Provider, MD Berkley       Social History     Socioeconomic History    Marital status:    Tobacco Use    Smoking status: Never     Passive exposure: Past    Smokeless tobacco: Never   Vaping Use    Vaping Use: Never used   Substance and Sexual Activity    Alcohol use: Yes     Alcohol/week: 1.0 standard drink of alcohol     Types: 1 Shots of liquor per week     Comment: Occasionally    Drug use: Never    Sexual activity: Yes     Partners: Male     Birth control/protection: Post-menopausal, None       Objective   Vital Signs:   /92   Pulse 72   Ht 167.6 cm (65.98\")   Wt 128 kg (282 lb)   SpO2 96% Comment: RA  BMI 45.54 kg/m²     Physical Exam  Vitals and nursing note reviewed.   HENT:      Head: Normocephalic.   Eyes:      Extraocular Movements: Extraocular movements intact.      Pupils: Pupils are equal, round, and reactive to light.   Cardiovascular:      Rate and Rhythm: Normal rate and regular rhythm.   Pulmonary:      Effort: Pulmonary effort is normal.      Breath sounds: Normal breath sounds.   Musculoskeletal:         General: Normal range of motion.   Skin:     General: Skin is warm and dry.   Neurological:      General: No focal deficit present.      Mental Status: She is alert and oriented to person, place, and time.   Psychiatric:         Mood and Affect: Mood normal.         Behavior: Behavior normal.        Result Review :          Results for orders placed in visit on 08/19/19    Pulmonary Function Test                 My interpretation of compliance download:  DURABLE MEDICAL EQUIPMENT - SCAN - PAP COMPLIANCE-LEGACY (02/25/2024)         Assessment and Plan {CC Problem List  Visit Diagnosis  ROS  Review (Popup)  Health Maintenance  Quality  BestPractice  Medications  SmartSets  SnapShot Encounters  Media : 23}    Diagnoses and all orders for this visit:    1. ADAM treated with BiPAP " (Primary)  Assessment & Plan:  Her recent compliance download showed excellent compliance with excellent control of her apneas and no significant leak.  I reviewed the data with her and her daughter who accompanies her today.  I did provide a prescription for a new BiPAP device as well.    Orders:  -     PAP Therapy    2. Mild intermittent asthma without complication  Assessment & Plan:  She is having no issues with her asthma at present and may continue her montelukast and albuterol HFA as needed.          Orders:  -     Spirometry with Diffusion Capacity & Lung Volumes; Future    3. Morbid obesity  Assessment & Plan:  Patient's (Body mass index is 45.54 kg/m².) indicates that they are morbidly/severely obese (BMI > 40 or > 35 with obesity - related health condition) with health conditions that include obstructive sleep apnea . Weight is unchanged.  Diet and exercise are encouraged and she will follow-up with her primary healthcare provider regarding her elevated BMI otherwise.            Nicolás Starr MD  2/26/2024  12:23 CST    Follow Up   Return in about 1 year (around 2/26/2025) for Complete PFT, To see me specifically.    Patient was given instructions and counseling regarding her condition or for health maintenance advice. Please see specific information pulled into the AVS if appropriate.

## 2024-05-17 ENCOUNTER — TRANSCRIBE ORDERS (OUTPATIENT)
Dept: ADMINISTRATIVE | Age: 64
End: 2024-05-17

## 2024-05-17 DIAGNOSIS — Z12.31 ENCOUNTER FOR SCREENING MAMMOGRAM FOR MALIGNANT NEOPLASM OF BREAST: Primary | ICD-10-CM

## 2024-05-21 ENCOUNTER — HOSPITAL ENCOUNTER (OUTPATIENT)
Dept: WOMENS IMAGING | Age: 64
Discharge: HOME | End: 2024-05-21
Payer: MEDICARE

## 2024-05-21 DIAGNOSIS — Z12.31 ENCOUNTER FOR SCREENING MAMMOGRAM FOR MALIGNANT NEOPLASM OF BREAST: ICD-10-CM

## 2024-05-21 PROCEDURE — 77063 BREAST TOMOSYNTHESIS BI: CPT

## 2025-02-26 ENCOUNTER — HOSPITAL ENCOUNTER (OUTPATIENT)
Dept: CT IMAGING | Facility: HOSPITAL | Age: 65
Discharge: HOME | End: 2025-02-26
Admitting: INTERNAL MEDICINE
Payer: MEDICARE

## 2025-02-26 ENCOUNTER — TELEPHONE (OUTPATIENT)
Dept: PULMONOLOGY | Facility: CLINIC | Age: 65
End: 2025-02-26

## 2025-02-26 ENCOUNTER — OFFICE VISIT (OUTPATIENT)
Dept: PULMONOLOGY | Facility: CLINIC | Age: 65
End: 2025-02-26
Payer: MEDICARE

## 2025-02-26 VITALS
OXYGEN SATURATION: 96 % | HEART RATE: 91 BPM | DIASTOLIC BLOOD PRESSURE: 96 MMHG | BODY MASS INDEX: 44.82 KG/M2 | SYSTOLIC BLOOD PRESSURE: 152 MMHG | HEIGHT: 65 IN | WEIGHT: 269 LBS

## 2025-02-26 DIAGNOSIS — R05.3 CHRONIC COUGH: Primary | Chronic | ICD-10-CM

## 2025-02-26 DIAGNOSIS — K86.89 PANCREATIC MASS: ICD-10-CM

## 2025-02-26 DIAGNOSIS — J45.20 MILD INTERMITTENT ASTHMA WITHOUT COMPLICATION: Primary | ICD-10-CM

## 2025-02-26 DIAGNOSIS — R05.3 CHRONIC COUGH: ICD-10-CM

## 2025-02-26 DIAGNOSIS — G47.33 OSA TREATED WITH BIPAP: Chronic | ICD-10-CM

## 2025-02-26 DIAGNOSIS — E66.01 MORBID OBESITY: Chronic | ICD-10-CM

## 2025-02-26 DIAGNOSIS — R91.8 LUNG NODULES: ICD-10-CM

## 2025-02-26 DIAGNOSIS — J45.20 MILD INTERMITTENT ASTHMA WITHOUT COMPLICATION: Chronic | ICD-10-CM

## 2025-02-26 PROCEDURE — 71250 CT THORAX DX C-: CPT

## 2025-02-26 RX ORDER — LEVALBUTEROL INHALATION SOLUTION 1.25 MG/3ML
SOLUTION RESPIRATORY (INHALATION)
COMMUNITY
Start: 2025-02-07 | End: 2025-02-26 | Stop reason: SDUPTHER

## 2025-02-26 RX ORDER — AZITHROMYCIN 250 MG/1
TABLET, FILM COATED ORAL
COMMUNITY
Start: 2025-01-29

## 2025-02-26 RX ORDER — DOXYCYCLINE 100 MG/1
CAPSULE ORAL
COMMUNITY
Start: 2024-12-31

## 2025-02-26 RX ORDER — LEVALBUTEROL INHALATION SOLUTION 1.25 MG/3ML
1 SOLUTION RESPIRATORY (INHALATION) EVERY 8 HOURS PRN
Qty: 270 ML | Refills: 11 | Status: SHIPPED | OUTPATIENT
Start: 2025-02-26

## 2025-02-26 RX ORDER — PREDNISONE 20 MG/1
1 TABLET ORAL AS NEEDED
COMMUNITY
Start: 2025-02-07

## 2025-02-26 NOTE — PROCEDURES
Spirometry with Diffusion Capacity & Lung Volumes    Performed by: Adriana Ba, RRT  Authorized by: Nicolás Starr MD     Pre Drug % Predicted    FVC: 93%   FEV1: 102%   FEF 25-75%: 145%   FEV1/FVC: 85%   T%   RV: 78%   DLCO: 104%   D/VAsb: 110%    Interpretation   Spirometry   Spirometry shows normal results.   Diffusion Capacity  The patient's diffusion capacity is normal.  Diffusion capacity is normal when corrected for alveolar volume.   Overall comments: The patient's spirometry is within normal limits and in fact midflows are supranormal.  Lung volumes reveal a decrease in functional residual capacity and expiratory reserve volume along with a mild decrease in residual volume with an elevated inspiratory capacity.  Diffusion capacity is within normal limits.  When current studies are compared to studies from the Respiratory Disease cCinic from 2019, the patient's current baseline spirometry reveals no significant change compared to previous prebronchodilator values and just a minimal decline compared to previous postbronchodilator values.  The patient's total lung capacity has not changed significantly compared to previous.  When corrected for alveolar volume there has been a decline in diffusion capacity compared to previous but it is still normal.

## 2025-02-26 NOTE — PROGRESS NOTES
PFT not performed due to patient's recent illness, increased cough.  Dr Starr approved for patient to still do PFT.

## 2025-02-26 NOTE — PROGRESS NOTES
Chief Complaint  Sleep Apnea, Asthma, and Cough    Subjective    History of Present Illness {CC  Problem List  Visit Diagnosis   Encounters  Notes  Medications  Labs  Result Review Imaging  Media: 23}    Alexandria Rizo presents to [facility] for sleep apnea, asthma, and chronic cough.    History of Present Illness   The patient states she has had issues with cough since November.  She has had evaluation [facility] and this included a chest x-ray which per report was unremarkable.  She has taken antibiotics and steroids with some minimal improvement but her symptoms have persisted.  She is also utilize Xopenex neb treatments which have helped her somewhat but again her symptoms persist.  I did E prescribe refills of her Xopenex.  I did tell her we will get a chest CT today and I did have that performed and called her with the results.  I did review the CT and it showed some small nodules which could be followed with a follow-up scan in 3 to 6 months but unfortunately the upper abdominal cuts revealed a mass at the head of the pancreas with some evidence of intra-abdominal adenopathy in the periportal region.  The liver was cirrhotic and had a few areas of abnormal hypodensity with concern about some underlying liver lesions.  Obviously the concern would be a pancreatic neoplasm with metastatic disease possible to liver and lungs.  I did call her with these results and she will check with Dr. Tan her primary care physician regarding referrals to GI and possibly general surgery if need be.  He states she has had some nausea and apparently was told she had a component of glucose intolerance and again that could certainly relate to the pancreatic abnormalities noted on her chest CT done earlier today.  She unfortunately will not be able to see me here in the office in the future as the [facility] system will not be in her network after Friday.  I told  her I be glad to forward any records necessary to other providers and told her that she could see if the [facility] pulmonology practice would be in her network.  She did express understanding and again I called her with the CT results.  She did undergo her pulmonary functions today and basic spirometry is normal.  Her lung volumes just reveal a decrease in FRC with a mild decrease in residual volume and an elevated inspiratory capacity but otherwise are normal.  Diffusion capacity is normal as well.  When the studies are compared to studies from the respiratory disease clinic from August 19, 2019, her baseline spirometry is basically unchanged compared to previous prebronchodilator values and they show a minimal decline compared to previous postbronchodilator values.  Her total lung capacity is unchanged.  When corrected for alveolar volume there has been a decline in diffusion capacity paired to previous but it still is normal.  Again I reviewed the studies with her and provide her a copy of both her current and previous PFT results.  She has had the Prevnar 13 and a Pneumovax in the past as well.  She has declined the flu shot.        Current Outpatient Medications:     albuterol (PROVENTIL HFA;VENTOLIN HFA) 108 (90 BASE) MCG/ACT inhaler, Inhale 2 puffs Every 4 (Four) Hours As Needed for Wheezing., Disp: , Rfl:     bumetanide (BUMEX) 2 MG tablet, Take 1 tablet by mouth As Needed., Disp: , Rfl:     dextromethorphan-guaifenesin (MUCINEX DM)  MG per 12 hr tablet, Take 1 tablet by mouth As Needed., Disp: , Rfl:     Diclofenac Sodium (VOLTAREN) 1 % gel gel, Apply 4 g topically to the appropriate area as directed 4 (Four) Times a Day As Needed., Disp: , Rfl:     diphenhydrAMINE (BENADRYL) 25 MG tablet, Every 6 (Six) Hours., Disp: , Rfl:     EPINEPHrine (EPIPEN JR) 0.15 MG/0.3ML solution auto-injector injection, Inject as directed, Disp: , Rfl:     levalbuterol (XOPENEX) 1.25 MG/3ML nebulizer solution, Take 1 ampule by  nebulization Every 8 (Eight) Hours As Needed for Wheezing., Disp: 270 mL, Rfl: 11    meclizine (ANTIVERT) 25 MG tablet, Take 1 tablet by mouth As Needed., Disp: , Rfl:     montelukast (SINGULAIR) 10 MG tablet, TAKE ONE TABLET BY MOUTH DAILY, Disp: 90 tablet, Rfl: 3    multivitamin with minerals tablet tablet, , Disp: , Rfl:     Naproxen Sodium 220 MG capsule, Take by mouth, Disp: , Rfl:     ondansetron ODT (ZOFRAN-ODT) 8 MG disintegrating tablet, Every 8 (Eight) Hours., Disp: , Rfl:     pantoprazole (PROTONIX) 40 MG EC tablet, Take 40 mg by mouth daily.  , Disp: , Rfl:     polyethyl glycol-propyl glycol (SYSTANE) 0.4-0.3 % solution ophthalmic solution, Administer 2 drops to both eyes Every 1 (One) Hour As Needed., Disp: , Rfl:     potassium chloride (K-DUR,KLOR-CON) 20 MEQ CR tablet, Take 1 tablet by mouth Daily., Disp: , Rfl:     predniSONE (DELTASONE) 20 MG tablet, Take 1 tablet by mouth As Needed., Disp: , Rfl:     promethazine (PHENERGAN) 25 MG tablet, Take 1 tablet by mouth Every 6 (Six) Hours As Needed for Nausea or Vomiting., Disp: , Rfl:     aspirin 81 MG chewable tablet, Chew 81 mg Daily. (Patient not taking: Reported on 2/26/2025), Disp: , Rfl:     azithromycin (ZITHROMAX) 250 MG tablet, TAKE 2 TABLETS BY MOUTH ON DAY 1, THEN TAKE 1 TABLET DAILY ON DAYS 2-5 (Patient not taking: Reported on 2/26/2025), Disp: , Rfl:     Calcium Carb-Cholecalciferol (CALCIUM 600 + D PO), Take  by mouth. (Patient not taking: Reported on 2/26/2025), Disp: , Rfl:     COLLAGEN PO, Take  by mouth. (Patient not taking: Reported on 2/26/2025), Disp: , Rfl:     doxycycline (VIBRAMYCIN) 100 MG capsule, TAKE ONE CAPSULE BY MOUTH ONCE DAILY AS TOLERATED. STAY OUT OF THE SUN (Patient not taking: Reported on 2/26/2025), Disp: , Rfl:     Ergocalciferol (VITAMIN D2 PO), 1.25 mg Daily. (Patient not taking: Reported on 2/26/2025), Disp: , Rfl:     Glucos-Chond-Sterol-Fish Oil (GLUCOSAMINE CHONDROITIN PLUS PO), Take  by mouth. (Patient not  "taking: Reported on 2/26/2025), Disp: , Rfl:     Hylan (SYNVISC ONE IX), Inject  into the appropriate joint as directed by provider. (Patient not taking: Reported on 2/26/2025), Disp: , Rfl:     levocetirizine (XYZAL) 5 MG tablet, , Disp: , Rfl:     Magnesium 250 MG tablet, , Disp: , Rfl:     multivitamin with minerals (HAIR SKIN AND NAILS FORMULA PO), Take 1 tablet by mouth Daily. (Patient not taking: Reported on 2/26/2025), Disp: , Rfl:     Zinc 25 MG tablet, Take 25 mg by mouth Daily. (Patient not taking: Reported on 2/26/2025), Disp: , Rfl:     Social History     Socioeconomic History    Marital status:    Tobacco Use    Smoking status: Never     Passive exposure: Past    Smokeless tobacco: Never   Vaping Use    Vaping status: Never Used   Substance and Sexual Activity    Alcohol use: Yes     Alcohol/week: 1.0 standard drink of alcohol     Types: 1 Shots of liquor per week     Comment: Occasionally    Drug use: Never    Sexual activity: Yes     Partners: Male     Birth control/protection: Post-menopausal, None       Objective   Vital Signs:   /96   Pulse 91   Ht 165.1 cm (65\")   Wt 122 kg (269 lb)   SpO2 96% Comment: RA  BMI 44.76 kg/m²     Physical Exam  Vitals and nursing note reviewed.   Constitutional:       Appearance: She is obese.      Comments: Her blood pressure is mildly elevated and she will follow-up with her primary care physician on this.   HENT:      Head: Normocephalic.   Eyes:      Extraocular Movements: Extraocular movements intact.      Pupils: Pupils are equal, round, and reactive to light.   Cardiovascular:      Rate and Rhythm: Normal rate and regular rhythm.   Pulmonary:      Effort: Pulmonary effort is normal.      Comments: She had reasonable air movement bilaterally with no adventitious sounds heard although she had some dry cough during the exam.  Musculoskeletal:         General: Normal range of motion.   Skin:     General: Skin is warm and dry.   Neurological:      " General: No focal deficit present.      Mental Status: She is alert and oriented to person, place, and time.   Psychiatric:         Mood and Affect: Mood normal.         Behavior: Behavior normal.        Result Review :    PFT Values          2025    10:00   Pre Drug PFT Results   FVC 93   FEV1 102   FEF 25-75% 145   FEV1/FVC 85   Other Tests PFT Results   TLC 91   RV 78   DLCO 104   D/VAsb 110         Results for orders placed in visit on 25    Spirometry with Diffusion Capacity & Lung Volumes    Narrative  Spirometry with Diffusion Capacity & Lung Volumes    Performed by: Adriana Ba, RRT  Authorized by: Nicolás Starr MD  Pre Drug % Predicted  FVC: 93%  FEV1: 102%  FEF 25-75%: 145%  FEV1/FVC: 85%  T%  RV: 78%  DLCO: 104%  D/VAsb: 110%    Interpretation  Spirometry  Spirometry shows normal results.  Diffusion Capacity  The patient's diffusion capacity is normal.  Diffusion capacity is normal when corrected for alveolar volume.  Overall comments: The patient's spirometry is within normal limits and in fact midflows are supranormal.  Lung volumes reveal a decrease in functional residual capacity and expiratory reserve volume along with a mild decrease in residual volume with an elevated inspiratory capacity.  Diffusion capacity is within normal limits.  When current studies are compared to studies from the Respiratory Disease cCinic from 2019, the patient's current baseline spirometry reveals no significant change compared to previous prebronchodilator values and just a minimal decline compared to previous postbronchodilator values.  The patient's total lung capacity has not changed significantly compared to previous.  When corrected for alveolar volume there has been a decline in diffusion capacity compared to previous but it is still normal.      Results for orders placed in visit on 19    Pulmonary Function Test                 My interpretation of imaging:     CT Chest Without Contrast Diagnostic (02/26/2025 11:14)         Assessment and Plan {CC Problem List  Visit Diagnosis  ROS  Review (Popup)  [facility] Maintenance  Quality  BestPractice  Medications  SmartSets  SnapShot Encounters  Media : 23}    Diagnoses and all orders for this visit:    1. Chronic cough (Primary)  Assessment & Plan:  This may very well related to her asthma.    Orders:  -     CT Chest Without Contrast Diagnostic; Future    2. Pancreatic mass  Assessment & Plan:  Again the chest CT performed earlier today did reveal a mass in the head of the pancreas with some associated periportal adenopathy and also some advisable for with some cirrhotic changes in liver lesions.  The obvious concern would be pancreatic cancer and she will follow-up with her primary care physician regarding additional GI workup.      3. Lung nodules  Assessment & Plan:  The lung nodules themselves could be followed with a repeat CT scan in 3 to 6 months but obviously the concern is that they may represent metastatic disease.  Again further workup of her pancreatic abnormalities will be arranged per her primary care physician.      4. Mild intermittent asthma without complication  Assessment & Plan:  Her PFTs today are sentient unremarkable in terms of spirometry but she has had some issues with cough which could relate to a previous viral infection salting and some issues with a cough variant component of her asthma and she does state at times she will have issues with shortness of breath particular she is exposed aero irritants or cold weather.  I did E prescribe refills of her Xopenex neb treatments and she can continue her montelukast as well.  Inhaled steroids could be added in the future if her symptoms persist but again she will be seeing a new pulmonary provider in the future.          Orders:  -     levalbuterol (XOPENEX) 1.25 MG/3ML nebulizer solution; Take 1 ampule by nebulization Every 8 (Eight) Hours As  Needed for Wheezing.  Dispense: 270 mL; Refill: 11  -     Spirometry with Diffusion Capacity & Lung Volumes    5. ADAM treated with BiPAP  Assessment & Plan:  She will continue her BiPAP therapy.      6. Morbid obesity  Assessment & Plan:  Patient's (Body mass index is 44.76 kg/m².) indicates that they are morbidly/severely obese (BMI > 40 or > 35 with obesity - related health condition) with health conditions that include obstructive sleep apnea . Weight is improving.  Diet and exercise are encouraged and she will follow-up with her primary care physician regarding her elevated BMI otherwise.            Nicolás Starr MD  2/26/2025  17:31 CST    Follow Up   Return if symptoms worsen or fail to improve.    Patient was given instructions and counseling regarding her condition or for health maintenance advice. Please see specific information pulled into the AVS if appropriate.

## 2025-02-26 NOTE — TELEPHONE ENCOUNTER
Caller: Alexandria Rizo    Relationship to patient: Self    Best call back number:     [phone] (Home)       Patient is needing: PTS NEW PCP IS BRITNEY RIOS  [phone]  SAID SHE WOULD LIKE PTS MOST RECENT CHART NOTES SENT TO HER

## 2025-02-26 NOTE — ASSESSMENT & PLAN NOTE
Her PFTs today are sentient unremarkable in terms of spirometry but she has had some issues with cough which could relate to a previous viral infection salting and some issues with a cough variant component of her asthma and she does state at times she will have issues with shortness of breath particular she is exposed aero irritants or cold weather.  I did E prescribe refills of her Xopenex neb treatments and she can continue her montelukast as well.  Inhaled steroids could be added in the future if her symptoms persist but again she will be seeing a new pulmonary provider in the future.

## 2025-02-26 NOTE — ASSESSMENT & PLAN NOTE
Again the chest CT performed earlier today did reveal a mass in the head of the pancreas with some associated periportal adenopathy and also some advisable for with some cirrhotic changes in liver lesions.  The obvious concern would be pancreatic cancer and she will follow-up with her primary care physician regarding additional GI workup.

## 2025-02-26 NOTE — ASSESSMENT & PLAN NOTE
The lung nodules themselves could be followed with a repeat CT scan in 3 to 6 months but obviously the concern is that they may represent metastatic disease.  Again further workup of her pancreatic abnormalities will be arranged per her primary care physician.

## 2025-02-26 NOTE — ASSESSMENT & PLAN NOTE
Patient's (Body mass index is 44.76 kg/m².) indicates that they are morbidly/severely obese (BMI > 40 or > 35 with obesity - related health condition) with health conditions that include obstructive sleep apnea . Weight is improving.  Diet and exercise are encouraged and she will follow-up with her primary care physician regarding her elevated BMI otherwise.

## 2025-02-26 NOTE — PATIENT INSTRUCTIONS
The patient's had a persistent cough since November which has improved minimally when she has taken antibiotics and steroids.  She does have some dry cough today.  Pulmonary function today show normal spirometry which is stable from studies performed in 2019 and a normal total lung capacity.  Her diffusion capacity when corrected for alveolar volume is normal although it has dropped somewhat from her previous studies but again remains normal.  I told her we will get a chest CT today and I will call her with results and this is going to be performed to look for evidence of any thing such as bronchiectasis or other pulmonary abnormalities that could contribute to her chronic cough.  Her insurance would no longer cover visits through the [facility] system after Friday and I did tell her she could check and see if the [facility] pulmonology practitioners would be in her current insurance network.  Again I will call her with her CT results when available and I also E prescribe refills of her Xopenex neb solution.

## 2025-03-07 RX ORDER — LORATADINE 10 MG/1
10 TABLET ORAL DAILY
COMMUNITY

## 2025-03-07 RX ORDER — POTASSIUM CHLORIDE 1.5 G/1.58G
20 POWDER, FOR SOLUTION ORAL DAILY
COMMUNITY

## 2025-03-07 RX ORDER — MULTIVITAMIN WITH IRON
1 TABLET ORAL DAILY
COMMUNITY

## 2025-03-20 ENCOUNTER — TELEPHONE (OUTPATIENT)
Dept: GASTROENTEROLOGY | Age: 65
End: 2025-03-20

## 2025-03-20 NOTE — TELEPHONE ENCOUNTER
3/19/25 - STAT referral rcvd from Dr. Corcoran on this patient for an EUS for panreatic head mass.  Dr. Loera reviewed - I called patient anted to get her scheduled for March 25 or 26, but the patient was having significant oral surgery on Monday, March 24 so we moved it to Tuesday April 1.Patient had some concerns on having the procedure done - said she has heard that if it is cancer, having the biopsy done could cause it to spread, we talked about the importance of needing the biopsy, so that if it is , a treatment can be started, but we also talked that at this point, we don't know and it may not be, the biopsy would give a better understanding.   Patient called back on 3/20/25 and LM that she wanted to cancel and wait until after her oral surgery is done, she said that she would call us when she was ready to schedule.  I am concerned that she may not call back.  I left a message with Dr. Corcoran's medical assistant, I'm hoping they can reach out to her and help alleviate her fears so we can get her scheduled.

## 2025-06-02 ENCOUNTER — HOSPITAL ENCOUNTER (OUTPATIENT)
Dept: WOMENS IMAGING | Age: 65
Discharge: HOME | End: 2025-06-02
Payer: MEDICARE

## 2025-06-02 VITALS — WEIGHT: 259 LBS | BODY MASS INDEX: 41.62 KG/M2 | HEIGHT: 66 IN

## 2025-06-02 DIAGNOSIS — Z12.31 ENCOUNTER FOR SCREENING MAMMOGRAM FOR MALIGNANT NEOPLASM OF BREAST: ICD-10-CM

## 2025-06-02 PROCEDURE — 77063 BREAST TOMOSYNTHESIS BI: CPT

## 2025-06-20 ENCOUNTER — TELEPHONE (OUTPATIENT)
Dept: HEMATOLOGY | Age: 65
End: 2025-06-20

## 2025-06-20 NOTE — TELEPHONE ENCOUNTER
MRS. RAMÍREZ, STATED THAT SHE CAN'T DO AN APPT AT THE TIME SLOT GIVEN FOR JUNE 26TH, SAID SHE WILL OUT OF TOWN TILL AFTER JULY 10TH, SHE HAD TOLD REFERRING OFFICE THAT AS WELL. I LET SHON GOMEZ RN KNOW. AND WILL REACH BACK OUT TO PT WHEN WE HAVE A NEW DATE AND TIME FOR HER. PT SAID THAT WOULD BE FINE.

## 2025-06-20 NOTE — TELEPHONE ENCOUNTER
CALLED Dain RAMÍREZ, AND GAVE HER THE DETAILS OF THE NEW PT APPT IN JULY, WE TRIED TO GET HER IN SOONER, AND SHE WAS NOT ABLE TO TAKE THAT APPT. SO SHE DID CONFIRM THE JULY APPT WOULD BE BETTER.

## 2025-07-10 ENCOUNTER — TELEPHONE (OUTPATIENT)
Dept: HEMATOLOGY | Age: 65
End: 2025-07-10

## 2025-07-10 NOTE — PROGRESS NOTES
for new patient.    Diagnoses and all orders for this visit:    Mass of pancreas  -     CBC with Auto Differential; Future  -     Comprehensive Metabolic Panel; Future  -     Cancer Antigen 19-9; Future    Cecum mass  -     CBC with Auto Differential; Future  -     Comprehensive Metabolic Panel; Future  -     Cancer Antigen 19-9; Future    Malignant neoplasm of head of pancreas (HCC)  -     Cancer Antigen 19-9; Future       Evangelina was seen today for new patient.    Diagnoses and all orders for this visit:    Mass of pancreas  -     CBC with Auto Differential; Future  -     Comprehensive Metabolic Panel; Future  -     Cancer Antigen 19-9; Future  -     CEA; Future    Cecum mass  -     CBC with Auto Differential; Future  -     Comprehensive Metabolic Panel; Future  -     Cancer Antigen 19-9; Future  -     CEA; Future    Malignant neoplasm of head of pancreas (HCC)  -     Cancer Antigen 19-9; Future  -     CEA; Future    Evangelina was seen today for new patient.    Diagnoses and all orders for this visit:    Mass of pancreas  -     CBC with Auto Differential; Future  -     Comprehensive Metabolic Panel; Future  -     Cancer Antigen 19-9; Future  -     CEA; Future    Cecum mass  -     CBC with Auto Differential; Future  -     Comprehensive Metabolic Panel; Future  -     Cancer Antigen 19-9; Future  -     CEA; Future    Malignant neoplasm of head of pancreas (HCC)  -     Cancer Antigen 19-9; Future  -     CEA; Future      Assessment: Pancreatic mass.   5/16/25 CT abd/pelvis ([facility]): Large pancreatic head mass measuring 87.4 mm transverse x 38.4 mm craniocaudal x 63.2 mm anterior-posterior with surrounding portal vein involvement and peripancreatic/periaortic lymphadenopathy up to 21.2 mm, consistent with metastatic disease; mild atrophy of pancreatic tail without ductal dilatation; cecal mass 53.2 mm suspicious for malignancy; cirrhotic liver morphology with steatosis; no free fluid or gas; uterus absent; no acute

## 2025-07-10 NOTE — TELEPHONE ENCOUNTER

## 2025-07-11 ENCOUNTER — TELEPHONE (OUTPATIENT)
Dept: GASTROENTEROLOGY | Age: 65
End: 2025-07-11

## 2025-07-11 NOTE — TELEPHONE ENCOUNTER
called patient to confirm  procedure scheduled for 7/16/25@  with Dr. Conteh at [facility] outpatient    No ans / lm

## 2025-07-14 ENCOUNTER — OFFICE VISIT (OUTPATIENT)
Dept: HEMATOLOGY | Age: 65
End: 2025-07-14
Payer: MEDICARE

## 2025-07-14 ENCOUNTER — HOSPITAL ENCOUNTER (OUTPATIENT)
Dept: INFUSION THERAPY | Age: 65
Discharge: HOME | End: 2025-07-14
Payer: MEDICARE

## 2025-07-14 VITALS
HEART RATE: 87 BPM | BODY MASS INDEX: 40.48 KG/M2 | TEMPERATURE: 98.6 F | DIASTOLIC BLOOD PRESSURE: 82 MMHG | HEIGHT: 66 IN | SYSTOLIC BLOOD PRESSURE: 146 MMHG | WEIGHT: 251.9 LBS | OXYGEN SATURATION: 98 %

## 2025-07-14 DIAGNOSIS — K86.89 MASS OF PANCREAS: ICD-10-CM

## 2025-07-14 DIAGNOSIS — C25.0 MALIGNANT NEOPLASM OF HEAD OF PANCREAS (HCC): ICD-10-CM

## 2025-07-14 DIAGNOSIS — K63.89 CECUM MASS: ICD-10-CM

## 2025-07-14 DIAGNOSIS — K86.89 MASS OF PANCREAS: Primary | ICD-10-CM

## 2025-07-14 LAB
ALBUMIN SERPL-MCNC: 3.1 G/DL (ref 3.5–5.2)
ALP SERPL-CCNC: 180 U/L (ref 35–104)
ALT SERPL-CCNC: 35 U/L (ref 5–33)
ANION GAP SERPL CALCULATED.3IONS-SCNC: 12 MMOL/L (ref 7–19)
AST SERPL-CCNC: 109 U/L (ref 5–32)
BASOPHILS # BLD: 0.03 K/UL (ref 0–0.2)
BASOPHILS NFR BLD: 0.3 % (ref 0–1)
BILIRUB SERPL-MCNC: 0.8 MG/DL (ref 0–1.2)
BUN SERPL-MCNC: 7 MG/DL (ref 8–23)
CALCIUM SERPL-MCNC: 9.2 MG/DL (ref 8.8–10.2)
CANCER AG19-9 SERPL-ACNC: 291 U/ML (ref 0–35)
CEA SERPL-MCNC: 17.8 NG/ML (ref 0–4.7)
CHLORIDE SERPL-SCNC: 100 MMOL/L (ref 98–107)
CO2 SERPL-SCNC: 26 MMOL/L (ref 22–29)
CREAT SERPL-MCNC: 0.7 MG/DL (ref 0.5–0.9)
EOSINOPHIL # BLD: 0.07 K/UL (ref 0–0.6)
EOSINOPHIL NFR BLD: 0.8 % (ref 0–5)
ERYTHROCYTE [DISTWIDTH] IN BLOOD BY AUTOMATED COUNT: 15.4 % (ref 11.5–14.5)
GLUCOSE SERPL-MCNC: 189 MG/DL (ref 70–99)
HCT VFR BLD AUTO: 41.2 % (ref 37–47)
HGB BLD-MCNC: 13 G/DL (ref 12–16)
LYMPHOCYTES # BLD: 1.01 K/UL (ref 1.1–4.5)
LYMPHOCYTES NFR BLD: 11.4 % (ref 20–40)
MCH RBC QN AUTO: 27.6 PG (ref 27–31)
MCHC RBC AUTO-ENTMCNC: 31.6 G/DL (ref 33–37)
MCV RBC AUTO: 87.5 FL (ref 81–99)
MONOCYTES # BLD: 0.6 K/UL (ref 0–0.9)
MONOCYTES NFR BLD: 6.8 % (ref 1–10)
NEUTROPHILS # BLD: 7.13 K/UL (ref 1.5–7.5)
NEUTS SEG NFR BLD: 80.5 % (ref 50–65)
PLATELET # BLD AUTO: 357 K/UL (ref 130–400)
PMV BLD AUTO: 9.5 FL (ref 9.4–12.3)
POTASSIUM SERPL-SCNC: 4.3 MMOL/L (ref 3.5–5.1)
PROT SERPL-MCNC: 7.2 G/DL (ref 6.4–8.3)
RBC # BLD AUTO: 4.71 M/UL (ref 4.2–5.4)
SODIUM SERPL-SCNC: 138 MMOL/L (ref 136–145)
WBC # BLD AUTO: 8.86 K/UL (ref 4.8–10.8)

## 2025-07-14 PROCEDURE — 86301 IMMUNOASSAY TUMOR CA 19-9: CPT

## 2025-07-14 PROCEDURE — 99204 OFFICE O/P NEW MOD 45 MIN: CPT | Performed by: INTERNAL MEDICINE

## 2025-07-14 PROCEDURE — 36415 COLL VENOUS BLD VENIPUNCTURE: CPT

## 2025-07-14 PROCEDURE — 80053 COMPREHEN METABOLIC PANEL: CPT

## 2025-07-14 PROCEDURE — 85025 COMPLETE CBC W/AUTO DIFF WBC: CPT

## 2025-07-14 PROCEDURE — 1123F ACP DISCUSS/DSCN MKR DOCD: CPT | Performed by: INTERNAL MEDICINE

## 2025-07-14 PROCEDURE — G2211 COMPLEX E/M VISIT ADD ON: HCPCS | Performed by: INTERNAL MEDICINE

## 2025-07-14 PROCEDURE — 99213 OFFICE O/P EST LOW 20 MIN: CPT

## 2025-07-14 PROCEDURE — 82378 CARCINOEMBRYONIC ANTIGEN: CPT

## 2025-07-15 ENCOUNTER — FOLLOWUP TELEPHONE ENCOUNTER (OUTPATIENT)
Dept: HEMATOLOGY | Age: 65
End: 2025-07-15

## 2025-07-15 NOTE — TELEPHONE ENCOUNTER
KIRK Larkin completed Distress screening follow up call. SW introduced self and explained social workers role and source of support.  Patient reports that she is prepping for a colonoscopy.  Patient states that she will be glad to have it over tomorrow. No other needs voiced at this time.  SW provided support through active listening and discussion. SW encouraged the patient to call if assistance is needed in the future.

## 2025-07-16 ENCOUNTER — ANCILLARY PROCEDURE (OUTPATIENT)
Dept: ENDOSCOPY | Age: 65
End: 2025-07-16
Attending: INTERNAL MEDICINE
Payer: MEDICARE

## 2025-07-16 ENCOUNTER — HOSPITAL ENCOUNTER (OUTPATIENT)
Age: 65
Setting detail: OUTPATIENT SURGERY
Discharge: HOME | End: 2025-07-16
Attending: INTERNAL MEDICINE | Admitting: INTERNAL MEDICINE
Payer: MEDICARE

## 2025-07-16 ENCOUNTER — ANESTHESIA EVENT (OUTPATIENT)
Dept: ENDOSCOPY | Age: 65
End: 2025-07-16
Payer: MEDICARE

## 2025-07-16 ENCOUNTER — ANESTHESIA (OUTPATIENT)
Dept: ENDOSCOPY | Age: 65
End: 2025-07-16
Payer: MEDICARE

## 2025-07-16 VITALS
RESPIRATION RATE: 20 BRPM | TEMPERATURE: 97 F | BODY MASS INDEX: 39.86 KG/M2 | SYSTOLIC BLOOD PRESSURE: 110 MMHG | DIASTOLIC BLOOD PRESSURE: 68 MMHG | OXYGEN SATURATION: 97 % | WEIGHT: 248 LBS | HEIGHT: 66 IN | HEART RATE: 106 BPM

## 2025-07-16 DIAGNOSIS — K63.89 CECUM MASS: ICD-10-CM

## 2025-07-16 PROBLEM — R19.01 RUQ ABDOMINAL MASS: Status: ACTIVE | Noted: 2025-07-16

## 2025-07-16 LAB
EKG P AXIS: 51 DEGREES
EKG P-R INTERVAL: 144 MS
EKG Q-T INTERVAL: 396 MS
EKG QRS DURATION: 76 MS
EKG QTC CALCULATION (BAZETT): 436 MS
EKG T AXIS: 37 DEGREES

## 2025-07-16 PROCEDURE — 88173 CYTOPATH EVAL FNA REPORT: CPT

## 2025-07-16 PROCEDURE — 7100000011 HC PHASE II RECOVERY - ADDTL 15 MIN: Performed by: INTERNAL MEDICINE

## 2025-07-16 PROCEDURE — 3700000000 HC ANESTHESIA ATTENDED CARE: Performed by: INTERNAL MEDICINE

## 2025-07-16 PROCEDURE — 3609018500 HC EGD US SCOPE W/ADJACENT STRUCTURES: Performed by: INTERNAL MEDICINE

## 2025-07-16 PROCEDURE — 3700000001 HC ADD 15 MINUTES (ANESTHESIA): Performed by: INTERNAL MEDICINE

## 2025-07-16 PROCEDURE — 2720000010 HC SURG SUPPLY STERILE: Performed by: INTERNAL MEDICINE

## 2025-07-16 PROCEDURE — 88342 IMHCHEM/IMCYTCHM 1ST ANTB: CPT

## 2025-07-16 PROCEDURE — 7100000010 HC PHASE II RECOVERY - FIRST 15 MIN: Performed by: INTERNAL MEDICINE

## 2025-07-16 PROCEDURE — 88360 TUMOR IMMUNOHISTOCHEM/MANUAL: CPT

## 2025-07-16 PROCEDURE — 2709999900 HC NON-CHARGEABLE SUPPLY: Performed by: INTERNAL MEDICINE

## 2025-07-16 PROCEDURE — 88341 IMHCHEM/IMCYTCHM EA ADD ANTB: CPT

## 2025-07-16 PROCEDURE — 2500000003 HC RX 250 WO HCPCS

## 2025-07-16 PROCEDURE — 2580000003 HC RX 258: Performed by: ANESTHESIOLOGY

## 2025-07-16 PROCEDURE — 6360000002 HC RX W HCPCS

## 2025-07-16 PROCEDURE — 3609010600 HC COLONOSCOPY POLYPECTOMY SNARE/COLD BIOPSY: Performed by: INTERNAL MEDICINE

## 2025-07-16 PROCEDURE — 88305 TISSUE EXAM BY PATHOLOGIST: CPT

## 2025-07-16 RX ORDER — FENTANYL CITRATE 50 UG/ML
INJECTION, SOLUTION INTRAMUSCULAR; INTRAVENOUS
Status: DISCONTINUED | OUTPATIENT
Start: 2025-07-16 | End: 2025-07-16 | Stop reason: SDUPTHER

## 2025-07-16 RX ORDER — LIDOCAINE HYDROCHLORIDE 10 MG/ML
INJECTION, SOLUTION EPIDURAL; INFILTRATION; INTRACAUDAL; PERINEURAL
Status: DISCONTINUED | OUTPATIENT
Start: 2025-07-16 | End: 2025-07-16 | Stop reason: SDUPTHER

## 2025-07-16 RX ORDER — PROPOFOL 10 MG/ML
INJECTION, EMULSION INTRAVENOUS
Status: DISCONTINUED | OUTPATIENT
Start: 2025-07-16 | End: 2025-07-16 | Stop reason: SDUPTHER

## 2025-07-16 RX ORDER — EPHEDRINE SULFATE/0.9% NACL/PF 25 MG/5 ML
SYRINGE (ML) INTRAVENOUS
Status: DISCONTINUED | OUTPATIENT
Start: 2025-07-16 | End: 2025-07-16 | Stop reason: SDUPTHER

## 2025-07-16 RX ORDER — SODIUM CHLORIDE, SODIUM LACTATE, POTASSIUM CHLORIDE, CALCIUM CHLORIDE 600; 310; 30; 20 MG/100ML; MG/100ML; MG/100ML; MG/100ML
INJECTION, SOLUTION INTRAVENOUS CONTINUOUS
Status: DISCONTINUED | OUTPATIENT
Start: 2025-07-16 | End: 2025-07-16 | Stop reason: HOSPADM

## 2025-07-16 RX ADMIN — FENTANYL CITRATE 50 MCG: 50 INJECTION INTRAMUSCULAR; INTRAVENOUS at 11:11

## 2025-07-16 RX ADMIN — SODIUM CHLORIDE, SODIUM LACTATE, POTASSIUM CHLORIDE, AND CALCIUM CHLORIDE: .6; .31; .03; .02 INJECTION, SOLUTION INTRAVENOUS at 10:46

## 2025-07-16 RX ADMIN — LIDOCAINE HYDROCHLORIDE 100 MG: 10 INJECTION, SOLUTION EPIDURAL; INFILTRATION; INTRACAUDAL; PERINEURAL at 10:52

## 2025-07-16 RX ADMIN — EPHEDRINE SULFATE 10 MG: 5 INJECTION INTRAVENOUS at 11:30

## 2025-07-16 RX ADMIN — PROPOFOL 200 MG: 10 INJECTION, EMULSION INTRAVENOUS at 11:27

## 2025-07-16 RX ADMIN — PROPOFOL 200 MG: 10 INJECTION, EMULSION INTRAVENOUS at 11:12

## 2025-07-16 RX ADMIN — PROPOFOL 400 MG: 10 INJECTION, EMULSION INTRAVENOUS at 10:52

## 2025-07-16 RX ADMIN — PROPOFOL 200 MG: 10 INJECTION, EMULSION INTRAVENOUS at 11:42

## 2025-07-16 ASSESSMENT — PAIN - FUNCTIONAL ASSESSMENT
PAIN_FUNCTIONAL_ASSESSMENT: NONE - DENIES PAIN
PAIN_FUNCTIONAL_ASSESSMENT: NONE - DENIES PAIN
PAIN_FUNCTIONAL_ASSESSMENT: 0-10

## 2025-07-16 NOTE — OP NOTE
Patient: Evangelina Pederson : 1960  Med Rec#: [id number] Acc#: [id number]   Primary Care Provider Kenton Roldan MD    Date of Procedure:  2025    Endoscopist: Masoud Conteh MD    Referring Provider: Kenton Roldan MD,     Operation Performed: Colonoscopy with biopsy    Indications: abnormal imaging     Anesthesia:  Sedation was administered by anesthesia who monitored the patient during the procedure.    I met with Evangelina Pederson prior to procedure. We discussed the procedure itself, and I have discussed the risks of endoscopy (including-- but not limited to-- pain, discomfort, bleeding potentially requiring second endoscopic procedure and/or blood transfusion, organ perforation requiring operative repair, damage to organs near the colon, infection, aspiration, cardiopulmonary/allergic reaction), benefits, indications to endoscopy. Additionally, we discussed options other than colonoscopy. The patient expressed understanding. All questions answered. The patient decided to proceed with the procedure.  Signed informed consent was placed on the chart.    Blood Loss: minimal    Withdrawal time: n/a  Bowel Prep: adequate     Complications: no immediate complications    DESCRIPTION OF PROCEDURE:     A time out was performed. After written informed consent was obtained, the patient was placed in the left lateral position.     The perianal area was inspected, and a digital rectal exam was performed. A rectal exam was performed: normal tone, no palpable lesions. At this point, a forward viewing Olympus colonoscope was inserted into the anus and carefully advanced to the cecum.  The cecum was identified by the ileocecal valve and the appendiceal orifice. The colonoscope was then slowly withdrawn with careful inspection of the mucosa in a linear and circumferential fashion. The scope was retroflexed in the rectum. Suction was utilized during the procedure to remove as much air as possible from the

## 2025-07-16 NOTE — ANESTHESIA POSTPROCEDURE EVALUATION
Department of Anesthesiology  Postprocedure Note    Patient: Evangelina Pederson  MRN: [id number]  YOB: 1960  Date of evaluation: 7/16/2025    Procedure Summary       Date: 07/16/25 Room / Location: [place] [id number] / [facility]    Anesthesia Start: 1049 Anesthesia Stop:     Procedures:       COLONOSCOPY POLYPECTOMY SNARE/BIOPSY      ESOPHAGOGASTRODUODENOSCOPY ULTRASOUND W/FNA Diagnosis:       Mass of head of pancreas      Cecum mass      (Mass of head of pancreas [K86.89])      (Cecum mass [K63.89])    Surgeons: Masoud Conteh MD Responsible Provider: Jose Luis Mei APRN - CRNA    Anesthesia Type: General, TIVA ASA Status: 3            Anesthesia Type: General, TIVA    Guillermo Phase I: Guillermo Score: 10    Guillermo Phase II:      Anesthesia Post Evaluation    Patient location during evaluation: bedside  Patient participation: complete - patient participated  Level of consciousness: awake  Pain score: 0  Airway patency: patent  Nausea & Vomiting: no nausea and no vomiting  Cardiovascular status: blood pressure returned to baseline and hemodynamically stable  Respiratory status: acceptable, room air, spontaneous ventilation and nonlabored ventilation  Hydration status: euvolemic  Pain management: adequate    No notable events documented.

## 2025-07-16 NOTE — H&P
Patient Name: Evangelina Pederson  : 1960  MRN: [id number]  DATE: 25    Allergies:   Allergies   Allergen Reactions    Nasonex [Mometasone Furoate] Shortness Of Breath    Spiriva Handihaler [Tiotropium Bromide Monohydrate] Shortness Of Breath    Aspergillus Species Dermatitis    Advair Diskus [Fluticasone-Salmeterol]      Breathing difficulties    Albuterol      Throat constriction    Azithromycin      Breathing difficulties, chest pain    Dicyclomine      depression    Duloxetine Hcl      Night miller    Hydroxychloroquine      Breathing difficulties    Lasix [Furosemide]      Breathing difficulties    Levaquin [Levofloxacin In D5w]      Breathing difficulties, chest pain    Levofloxacin     Lyrica [Pregabalin]      Side effects    Nasonex [Mometasone]      Breathing difficulties    Other     Proair Respiclick [Albuterol Sulfate]      Severe muscle cramps    Qvar [Beclomethasone]      Breathing difficulties    Septra [Sulfamethoxazole-Trimethoprim]      Breathing difficulties    Simvastatin      Mouth draw, slurring, headache    Sulfa Antibiotics     Sulfasalazine     Symbicort [Budesonide-Formoterol Fumarate]     Topiramate      Breathing difficulties, night miller    Medrol [Methylprednisolone] Rash    Omnipen [Ampicillin] Nausea And Vomiting        ENDOSCOPY  History and Physical    Procedure:    [x] Diagnostic Colonoscopy       [] Screening Colonoscopy  [x] EGD      [] ERCP      [x] EUS       [] Other    [x] Previous office notes/History and Physical reviewed from the patients chart. Please see EMR for further details of HPI. I have examined the patient's status immediately prior to the procedure and:      Indications/HPI:    []Abdominal Pain   []Barretts  []Screening/Surveillance   []History of Polyps  []Dysphagia            [] +Cologard/DNA testing  []Abnormal Imaging              []EOE Hx              [] Family Hx of CRC/Polyps  []Anemia                            []Food Impaction       []Recent

## 2025-07-16 NOTE — OP NOTE
Referring/Primary Care Provider: Kenton Roldan MD,     Date of Procedure: 07/16/25    Procedure:   1. EGD with Endoscopic Ultrasound with FNA     Indications:   1. Abnormal imaging with questionable pancreatic mass and possible lymphadenopathy in the RUQ  2. Abdominal pain  3. Weight loss    Anesthesia:  Sedation was administered by anesthesia who monitored the patient during the procedure.    Procedure:   After reviewing the patient's chart, H&P, medications, obtaining informed consent, and discussing risks benefits and alternatives to the procedure the patient was placed in the left lateral decubitus position. A oblique viewing Olympus 140 Linear EUS scope was lubricated and inserted through the mouth into the oropharynx. Under indirect visualization, the upper esophagus was intubated. The scope was advanced to the level of the third portion of duodenum with limited views of the esophageal mucosa. Findings and maneuvers are listed in impression below.     The patient tolerated the procedure well. There were no immediate complications.    Findings:   Endoscopic Finding:   - endoscopic evaluation of the upper GI tract showed:   - small benign appearing polyps in the gastric lumen most c/w fundic gland polyps   - the duodenum appeared normal   - limited esophageal evaluation appeared normal    Endosonographic Findings:  - The celiac axis and associated vascular structures was identified and examined.  No concerning or malignant lymphadenopathy was identified.     - Limited views of the left lobe of the liver revealed no biliary dilation. I did not appreciate a focal hepatic mass    - The EUS scope was advanced to the duodenal bulb - the CBD was non-dilated.     - Pancreas: no obvious MPD dilation seen.     - In the RUQ and portal area, multiple areas of focal, hypoechoic tissue were noted. The lesions appeared most c/w lymphadenopathy. These measured 2cm or more in size. A large hypoechoic lesion in

## 2025-07-16 NOTE — DISCHARGE INSTRUCTIONS
RECOMMENDATIONS:   - Await cytology results.   - Check CA 19-9, CEA, AFT in recovery today   - Send to Medical and Surgical Oncology      Upper GI Endoscopy and Colonoscopy: What to Expect at Home  Your Recovery    After an upper GI endoscopy, you may have a sore throat for a day or two after the test.    After a colonoscopy you may be bloated or have gas pains. You may need to pass gas. If a biopsy was done or a polyp was removed, you may have streaks of blood in your stool (feces) for a few days. Problems such as heavy rectal bleeding may not occur until several weeks after the test. This isn't common. But it can happen after polyps are removed.    How can you care for yourself at home?  Activity   Rest as much as you need to after you go home.  You should be able to go back to your usual activities the day after the test.  You should not drive or operate equipment for the remainder of today.    Diet   Follow your doctor's directions for eating after the test.  Unless your doctor has told you not to, drink plenty of fluids. This helps to replace the fluids that were lost during the colon prep.  Do not drink alcohol for 24 hours    Medications   If you have a sore throat the day after the test, use an over-the-counter spray to numb your throat.  If polyps were removed or biopsies removed, your doctor may tell you to continue holding your anticoagulants or NSAIDS. Check with your doctor to see when you can resume these types of medications.       Follow-up care is a key part of your treatment and safety. Be sure to make and go to all appointments, and call your doctor if you are having problems. It's also a good idea to know your test results and keep a list of the medicines you take.  When should you call for help?   Call 911 anytime you think you may need emergency care. For example, call if:    You passed out (lost consciousness).     You have trouble breathing.     You pass maroon or bloody stools.   Call your

## 2025-07-16 NOTE — H&P
Patient Name: Evangelina Pederson  : 1960  MRN: [id number]  DATE: 25    Allergies:   Allergies   Allergen Reactions    Nasonex [Mometasone Furoate] Shortness Of Breath    Spiriva Handihaler [Tiotropium Bromide Monohydrate] Shortness Of Breath    Aspergillus Species Dermatitis    Advair Diskus [Fluticasone-Salmeterol]      Breathing difficulties    Albuterol      Throat constriction    Azithromycin      Breathing difficulties, chest pain    Dicyclomine      depression    Duloxetine Hcl      Night miller    Hydroxychloroquine      Breathing difficulties    Lasix [Furosemide]      Breathing difficulties    Levaquin [Levofloxacin In D5w]      Breathing difficulties, chest pain    Levofloxacin     Lyrica [Pregabalin]      Side effects    Nasonex [Mometasone]      Breathing difficulties    Other     Proair Respiclick [Albuterol Sulfate]      Severe muscle cramps    Qvar [Beclomethasone]      Breathing difficulties    Septra [Sulfamethoxazole-Trimethoprim]      Breathing difficulties    Simvastatin      Mouth draw, slurring, headache    Sulfa Antibiotics     Sulfasalazine     Symbicort [Budesonide-Formoterol Fumarate]     Topiramate      Breathing difficulties, night miller    Medrol [Methylprednisolone] Rash    Omnipen [Ampicillin] Nausea And Vomiting        ENDOSCOPY  History and Physical    Procedure:    [] Diagnostic Colonoscopy       [] Screening Colonoscopy  [x] EGD      [] ERCP      [x] EUS       [] Other    [x] Previous office notes/History and Physical reviewed from the patients chart. Please see EMR for further details of HPI. I have examined the patient's status immediately prior to the procedure and:      Indications/HPI:    [x]Abdominal mass, abnormal imaging     Anesthesia:   [x] MAC [] Moderate Sedation   [] General   [] None     ROS: 12 pt Review of Symptoms was negative unless mentioned above    Medications:   Prior to Admission medications    Medication Sig Start Date End Date Taking?

## 2025-07-23 NOTE — PROGRESS NOTES
MEDICAL ONCOLOGY PROGRESS NOTE    Patient Name: Evangelina Tucker  MRN: [id number]  YOB: 1960  Date of evaluation: 7/25/2025    HISTORY OF PRESENT ILLNESS:    Diagnosis  Metastatic colonic adenocarcinoma  Cirrhosis    Treatment Summary  Recommend modified FOLFOX, palliative    Cancer History  Evangelina tucker was first seen by me on 7/14/2025.  The patient was referred for findings of a pancreatic mass.  This was initially diagnosed in February 2025.  The patient has not had a tissue diagnosis.  She is now scheduled with GI, Dr. Masoud Conteh for EGD/ERCP/EUS and FNA biopsy and also colonoscopy.  2/26/25 CT chest w/o (P): Multiple scattered pulmonary nodules throughout both lungs measuring up to 7 mm. These may be infectious/inflammatory in nature but differential diagnosis also includes metastatic disease. Recommend continue imaging surveillance with follow-up chest CT in 3 to 6 months. The liver is cirrhotic and contains a few areas of abnormal focal hypodensity which could represent underlying liver lesions. Also of high concern, there is periportal lymphadenopathy and a 4 cm mass in the periportal region which abuts the pancreatic neck. Further evaluation is warranted with either CT or MR liver protocol.   3/7/25 [facility] labs: CBC-WBC 8.3, HGB 10, HCT 35.4, ; Iron 21, TIBC 427, Iron sat 5%, Ferritin 10, B12 736, Folate 19;  33  3/7/25 Evaluation by Dr Krissy Corcoran/[facility] to discuss [facility] CT findings who recommended referral to Dr Masoud Conteh/[facility] for further evaluation of pancreatic mass. Patient cancelled appointment  5/5/25 Evaluation by Dr Ketnon Roldan/[facility] for second opinion for pancreatic mass who reviewed Feb 2025 CT chest and recommended CT abd/pelvis to further evaluate pancreas mass  05/16/25 CT ABD/PELVIS ([facility]): Large pancreatic head mass 87.4 x 38.4 x 63.2 mm with portal vein involvement and peripancreatic/periaortic lymphadenopathy up to 21.2 mm, consistent

## 2025-07-25 ENCOUNTER — TELEMEDICINE (OUTPATIENT)
Dept: HEMATOLOGY | Age: 65
End: 2025-07-25
Payer: MEDICARE

## 2025-07-25 DIAGNOSIS — Z71.3 ENCOUNTER FOR DIETARY CONSULTATION: ICD-10-CM

## 2025-07-25 DIAGNOSIS — C25.0 MALIGNANT NEOPLASM OF HEAD OF PANCREAS (HCC): ICD-10-CM

## 2025-07-25 DIAGNOSIS — Z71.89 CARE PLAN DISCUSSED WITH PATIENT: ICD-10-CM

## 2025-07-25 DIAGNOSIS — C18.9 ADENOCARCINOMA OF COLON (HCC): Primary | ICD-10-CM

## 2025-07-25 DIAGNOSIS — I87.8 POOR VENOUS ACCESS: Primary | ICD-10-CM

## 2025-07-25 DIAGNOSIS — C18.9 ADENOCARCINOMA OF COLON (HCC): ICD-10-CM

## 2025-07-25 PROCEDURE — 99214 OFFICE O/P EST MOD 30 MIN: CPT | Performed by: INTERNAL MEDICINE

## 2025-07-28 ENCOUNTER — CLINICAL DOCUMENTATION (OUTPATIENT)
Facility: HOSPITAL | Age: 65
End: 2025-07-28

## 2025-07-28 NOTE — PROGRESS NOTES
Patient Assistance      After review, patient has Medicare and Medicaid and will not need assistance at this time.

## 2025-07-30 ENCOUNTER — OFFICE VISIT (OUTPATIENT)
Dept: VASCULAR SURGERY | Age: 65
End: 2025-07-30
Payer: MEDICARE

## 2025-07-30 VITALS
TEMPERATURE: 98.7 F | DIASTOLIC BLOOD PRESSURE: 82 MMHG | SYSTOLIC BLOOD PRESSURE: 110 MMHG | OXYGEN SATURATION: 95 % | HEART RATE: 106 BPM

## 2025-07-30 DIAGNOSIS — I87.8 POOR VENOUS ACCESS: Primary | ICD-10-CM

## 2025-07-30 PROCEDURE — G8427 DOCREV CUR MEDS BY ELIG CLIN: HCPCS | Performed by: NURSE PRACTITIONER

## 2025-07-30 PROCEDURE — G8400 PT W/DXA NO RESULTS DOC: HCPCS | Performed by: NURSE PRACTITIONER

## 2025-07-30 PROCEDURE — 99203 OFFICE O/P NEW LOW 30 MIN: CPT | Performed by: NURSE PRACTITIONER

## 2025-07-30 PROCEDURE — 1036F TOBACCO NON-USER: CPT | Performed by: NURSE PRACTITIONER

## 2025-07-30 PROCEDURE — G8417 CALC BMI ABV UP PARAM F/U: HCPCS | Performed by: NURSE PRACTITIONER

## 2025-07-30 PROCEDURE — 1090F PRES/ABSN URINE INCON ASSESS: CPT | Performed by: NURSE PRACTITIONER

## 2025-07-30 PROCEDURE — 1123F ACP DISCUSS/DSCN MKR DOCD: CPT | Performed by: NURSE PRACTITIONER

## 2025-07-30 PROCEDURE — 3017F COLORECTAL CA SCREEN DOC REV: CPT | Performed by: NURSE PRACTITIONER

## 2025-07-30 RX ORDER — ACETAMINOPHEN AND CODEINE PHOSPHATE 300; 30 MG/1; MG/1
1 TABLET ORAL EVERY 6 HOURS PRN
COMMUNITY
Start: 2025-07-28

## 2025-07-30 RX ORDER — FERROUS SULFATE 325(65) MG
325 TABLET ORAL
COMMUNITY
Start: 2025-03-10

## 2025-07-30 RX ORDER — MUPIROCIN 2 %
OINTMENT (GRAM) TOPICAL
Qty: 1 EACH | Refills: 0 | Status: SHIPPED | OUTPATIENT
Start: 2025-07-30 | End: 2025-08-06

## 2025-07-30 RX ORDER — AMOXICILLIN 500 MG/1
500 CAPSULE ORAL 2 TIMES DAILY
COMMUNITY
Start: 2025-06-18

## 2025-07-31 RX ORDER — SODIUM CHLORIDE 0.9 % (FLUSH) 0.9 %
5-40 SYRINGE (ML) INJECTION PRN
OUTPATIENT
Start: 2025-07-31

## 2025-07-31 RX ORDER — SODIUM CHLORIDE 0.9 % (FLUSH) 0.9 %
5-40 SYRINGE (ML) INJECTION EVERY 12 HOURS SCHEDULED
OUTPATIENT
Start: 2025-07-31

## 2025-07-31 RX ORDER — SODIUM CHLORIDE 9 MG/ML
INJECTION, SOLUTION INTRAVENOUS PRN
OUTPATIENT
Start: 2025-07-31

## 2025-07-31 NOTE — PROGRESS NOTES
Evangelina Pederson (:  1960) is a 64 y.o. female,New patient, here for evaluation of the following chief complaint(s):  New Patient (port placement)            SUBJECTIVE/OBJECTIVE:    Evangelina has a history of cancer.  She has had this for < 1 year. She is in need of chemotherapy.  We have been asked to place a mediport for this reason.      I have personally reviewed the following: problem list, current meds, allergies, PMH, PSH, family hx, and social hx  Evangelina Pederson is a 64 y.o. female with the following history as recorded in [facility]:  Patient Active Problem List    Diagnosis Date Noted    Poor venous access 2025    Adenocarcinoma of colon (HCC) 2025    Cecum mass 2025    Colonic mass 2025    RUQ abdominal mass 2025    Intractable migraine with aura without status migrainosus 06/15/2017    Facial weakness 06/15/2017    Sleep apnea, obstructive 2017    Restless legs syndrome 2017    Diabetic polyneuropathy associated with type 2 diabetes mellitus (HCC) 2017    Macromastia 2017    Easy bruising 10/28/2016    Abnormal mammogram 10/19/2016    Lump or mass in breast 10/19/2016    Diffuse cystic mastopathy 10/19/2016    Abnormal stress echo     Angina pectoris syndrome     Chest pain     Hyperlipidemia     Diabetes mellitus (HCC)     Sleep apnea      Current Outpatient Medications   Medication Sig Dispense Refill    acetaminophen-codeine (TYLENOL #3) 300-30 MG per tablet Take 1 tablet by mouth every 6 hours as needed.      amoxicillin (AMOXIL) 500 MG capsule Take 1 capsule by mouth 2 times daily      ferrous sulfate (IRON 325) 325 (65 Fe) MG tablet Take 1 tablet by mouth daily (with breakfast) Monday, Wed, Fri      mupirocin (BACTROBAN) 2 % ointment Apply topically 3 times daily. 1 each 0    montelukast (SINGULAIR) 10 MG tablet Take 1 tablet by mouth nightly      albuterol sulfate (PROAIR RESPICLICK) 108 (90 BASE) MCG/ACT aerosol powder inhalation Inhale

## 2025-07-31 NOTE — H&P
Evangelina Pedreson (:  1960) is a 64 y.o. female,New patient, here for evaluation of the following chief complaint(s):  New Patient (port placement)            SUBJECTIVE/OBJECTIVE:    Evangelina has a history of cancer.  She has had this for < 1 year. She is in need of chemotherapy.  We have been asked to place a mediport for this reason.      I have personally reviewed the following: problem list, current meds, allergies, PMH, PSH, family hx, and social hx  Evangelina Pederson is a 64 y.o. female with the following history as recorded in [facility]:  Patient Active Problem List    Diagnosis Date Noted    Poor venous access 2025    Adenocarcinoma of colon (HCC) 2025    Cecum mass 2025    Colonic mass 2025    RUQ abdominal mass 2025    Intractable migraine with aura without status migrainosus 06/15/2017    Facial weakness 06/15/2017    Sleep apnea, obstructive 2017    Restless legs syndrome 2017    Diabetic polyneuropathy associated with type 2 diabetes mellitus (HCC) 2017    Macromastia 2017    Easy bruising 10/28/2016    Abnormal mammogram 10/19/2016    Lump or mass in breast 10/19/2016    Diffuse cystic mastopathy 10/19/2016    Abnormal stress echo     Angina pectoris syndrome     Chest pain     Hyperlipidemia     Diabetes mellitus (HCC)     Sleep apnea      Current Outpatient Medications   Medication Sig Dispense Refill    acetaminophen-codeine (TYLENOL #3) 300-30 MG per tablet Take 1 tablet by mouth every 6 hours as needed.      amoxicillin (AMOXIL) 500 MG capsule Take 1 capsule by mouth 2 times daily      ferrous sulfate (IRON 325) 325 (65 Fe) MG tablet Take 1 tablet by mouth daily (with breakfast) Monday, Wed, Fri      mupirocin (BACTROBAN) 2 % ointment Apply topically 3 times daily. 1 each 0    montelukast (SINGULAIR) 10 MG tablet Take 1 tablet by mouth nightly      albuterol sulfate (PROAIR RESPICLICK) 108 (90 BASE) MCG/ACT aerosol powder inhalation Inhale

## 2025-08-01 ENCOUNTER — HOSPITAL ENCOUNTER (OUTPATIENT)
Dept: CT IMAGING | Age: 65
Discharge: HOME | End: 2025-08-01
Payer: MEDICARE

## 2025-08-01 DIAGNOSIS — C18.9 ADENOCARCINOMA OF COLON (HCC): ICD-10-CM

## 2025-08-01 DIAGNOSIS — C25.0 MALIGNANT NEOPLASM OF HEAD OF PANCREAS (HCC): ICD-10-CM

## 2025-08-01 PROCEDURE — 71260 CT THORAX DX C+: CPT

## 2025-08-01 PROCEDURE — 74177 CT ABD & PELVIS W/CONTRAST: CPT

## 2025-08-01 PROCEDURE — 6360000004 HC RX CONTRAST MEDICATION: Performed by: INTERNAL MEDICINE

## 2025-08-01 RX ORDER — IOPAMIDOL 755 MG/ML
75 INJECTION, SOLUTION INTRAVASCULAR
Status: COMPLETED | OUTPATIENT
Start: 2025-08-01 | End: 2025-08-01

## 2025-08-01 RX ADMIN — IOPAMIDOL 75 ML: 755 INJECTION, SOLUTION INTRAVENOUS at 09:27

## 2025-08-05 LAB
DNA RANGE(S) EXAMINED NAR: NORMAL
GENE DIS ANL INTERP-IMP: POSITIVE
GENE DIS ASSESSED: NORMAL
GENE MUT TESTED BLD/T: 5.8 M/MB
HLA-A HIGH RES: NORMAL
HLA-A UNRESLVD ALLELES HIGH RES: YES
HLA-B HIGH RES: NORMAL
HLA-B UNRESLVD ALLELES HIGH RES: YES
HLA-C HIGH RES: NORMAL
HLA-C UNRESLVD ALLELES HIGH RES: YES
MSI CA SPEC-IMP: NORMAL
REASON FOR STUDY: NORMAL
TEMPUS GERMLINE NOTE: NORMAL
TEMPUS HLA-A SAMPLE TYPE: NORMAL
TEMPUS HLA-B SAMPLE TYPE: NORMAL
TEMPUS HLA-C SAMPLE TYPE: NORMAL
TEMPUS LCA: NORMAL
TEMPUS PERTINENTNEGATIVES: NORMAL
TEMPUS PORTAL: NORMAL
TEMPUS THERAPY1: NORMAL
TEMPUS THERAPYCOUNT: 1
TEMPUS TRIAL1: NORMAL
TEMPUS TRIAL3: NORMAL
TEMPUS TRIALCOUNT: 3
TEMPUS TRIALMATCHES2: NORMAL

## 2025-08-11 ENCOUNTER — APPOINTMENT (OUTPATIENT)
Dept: INTERVENTIONAL RADIOLOGY/VASCULAR | Age: 65
End: 2025-08-11
Attending: SURGERY
Payer: MEDICARE

## 2025-08-11 ENCOUNTER — ANESTHESIA (OUTPATIENT)
Dept: OPERATING ROOM | Age: 65
End: 2025-08-11
Payer: MEDICARE

## 2025-08-11 ENCOUNTER — ANESTHESIA EVENT (OUTPATIENT)
Dept: OPERATING ROOM | Age: 65
End: 2025-08-11
Payer: MEDICARE

## 2025-08-11 ENCOUNTER — HOSPITAL ENCOUNTER (OUTPATIENT)
Age: 65
Setting detail: OUTPATIENT SURGERY
Discharge: HOME | End: 2025-08-11
Attending: SURGERY | Admitting: SURGERY
Payer: MEDICARE

## 2025-08-11 VITALS
RESPIRATION RATE: 16 BRPM | WEIGHT: 242 LBS | OXYGEN SATURATION: 95 % | BODY MASS INDEX: 38.89 KG/M2 | HEART RATE: 90 BPM | HEIGHT: 66 IN | DIASTOLIC BLOOD PRESSURE: 81 MMHG | TEMPERATURE: 98.1 F | SYSTOLIC BLOOD PRESSURE: 116 MMHG

## 2025-08-11 LAB
GLUCOSE BLD-MCNC: 137 MG/DL (ref 70–99)
GLUCOSE BLD-MCNC: 166 MG/DL (ref 70–99)
PERFORMED ON: ABNORMAL
PERFORMED ON: ABNORMAL

## 2025-08-11 PROCEDURE — 36561 INSERT TUNNELED CV CATH: CPT | Performed by: SURGERY

## 2025-08-11 PROCEDURE — 3600000013 HC SURGERY LEVEL 3 ADDTL 15MIN: Performed by: SURGERY

## 2025-08-11 PROCEDURE — 76937 US GUIDE VASCULAR ACCESS: CPT

## 2025-08-11 PROCEDURE — 6360000002 HC RX W HCPCS: Performed by: SURGERY

## 2025-08-11 PROCEDURE — 2500000003 HC RX 250 WO HCPCS: Performed by: SURGERY

## 2025-08-11 PROCEDURE — 7100000011 HC PHASE II RECOVERY - ADDTL 15 MIN: Performed by: SURGERY

## 2025-08-11 PROCEDURE — 3700000000 HC ANESTHESIA ATTENDED CARE: Performed by: SURGERY

## 2025-08-11 PROCEDURE — 2500000003 HC RX 250 WO HCPCS

## 2025-08-11 PROCEDURE — 6360000002 HC RX W HCPCS

## 2025-08-11 PROCEDURE — 2500000003 HC RX 250 WO HCPCS: Performed by: NURSE PRACTITIONER

## 2025-08-11 PROCEDURE — 2709999900 HC NON-CHARGEABLE SUPPLY: Performed by: SURGERY

## 2025-08-11 PROCEDURE — 77001 FLUOROGUIDE FOR VEIN DEVICE: CPT

## 2025-08-11 PROCEDURE — 7100000000 HC PACU RECOVERY - FIRST 15 MIN: Performed by: SURGERY

## 2025-08-11 PROCEDURE — 7100000010 HC PHASE II RECOVERY - FIRST 15 MIN: Performed by: SURGERY

## 2025-08-11 PROCEDURE — C1769 GUIDE WIRE: HCPCS | Performed by: SURGERY

## 2025-08-11 PROCEDURE — 6370000000 HC RX 637 (ALT 250 FOR IP): Performed by: SURGERY

## 2025-08-11 PROCEDURE — 6360000002 HC RX W HCPCS: Performed by: NURSE PRACTITIONER

## 2025-08-11 PROCEDURE — 2580000003 HC RX 258: Performed by: ANESTHESIOLOGY

## 2025-08-11 PROCEDURE — 77001 FLUOROGUIDE FOR VEIN DEVICE: CPT | Performed by: SURGERY

## 2025-08-11 PROCEDURE — 3700000001 HC ADD 15 MINUTES (ANESTHESIA): Performed by: SURGERY

## 2025-08-11 PROCEDURE — 7100000001 HC PACU RECOVERY - ADDTL 15 MIN: Performed by: SURGERY

## 2025-08-11 PROCEDURE — 3600000003 HC SURGERY LEVEL 3 BASE: Performed by: SURGERY

## 2025-08-11 PROCEDURE — 76937 US GUIDE VASCULAR ACCESS: CPT | Performed by: SURGERY

## 2025-08-11 PROCEDURE — 82962 GLUCOSE BLOOD TEST: CPT

## 2025-08-11 PROCEDURE — C1788 PORT, INDWELLING, IMP: HCPCS | Performed by: SURGERY

## 2025-08-11 DEVICE — PORT INFUS PLAS SGL LUMN W/ 9.6FR SIL CATH AIRGUARD VLV: Type: IMPLANTABLE DEVICE | Site: CHEST | Status: FUNCTIONAL

## 2025-08-11 RX ORDER — SODIUM CHLORIDE 9 MG/ML
INJECTION, SOLUTION INTRAVENOUS PRN
Status: CANCELLED | OUTPATIENT
Start: 2025-08-11

## 2025-08-11 RX ORDER — DIPHENHYDRAMINE HYDROCHLORIDE 50 MG/ML
12.5 INJECTION, SOLUTION INTRAMUSCULAR; INTRAVENOUS
Status: DISCONTINUED | OUTPATIENT
Start: 2025-08-11 | End: 2025-08-11 | Stop reason: HOSPADM

## 2025-08-11 RX ORDER — SODIUM CHLORIDE 0.9 % (FLUSH) 0.9 %
5-40 SYRINGE (ML) INJECTION EVERY 12 HOURS SCHEDULED
Status: DISCONTINUED | OUTPATIENT
Start: 2025-08-11 | End: 2025-08-11 | Stop reason: HOSPADM

## 2025-08-11 RX ORDER — LABETALOL HYDROCHLORIDE 5 MG/ML
10 INJECTION, SOLUTION INTRAVENOUS
Status: DISCONTINUED | OUTPATIENT
Start: 2025-08-11 | End: 2025-08-11 | Stop reason: HOSPADM

## 2025-08-11 RX ORDER — HYDRALAZINE HYDROCHLORIDE 20 MG/ML
10 INJECTION INTRAMUSCULAR; INTRAVENOUS
Status: DISCONTINUED | OUTPATIENT
Start: 2025-08-11 | End: 2025-08-11 | Stop reason: HOSPADM

## 2025-08-11 RX ORDER — PROCHLORPERAZINE EDISYLATE 5 MG/ML
5 INJECTION INTRAMUSCULAR; INTRAVENOUS
Status: COMPLETED | OUTPATIENT
Start: 2025-08-11 | End: 2025-08-11

## 2025-08-11 RX ORDER — SODIUM CHLORIDE 0.9 % (FLUSH) 0.9 %
5-40 SYRINGE (ML) INJECTION PRN
Status: DISCONTINUED | OUTPATIENT
Start: 2025-08-11 | End: 2025-08-11 | Stop reason: HOSPADM

## 2025-08-11 RX ORDER — FENTANYL CITRATE 50 UG/ML
INJECTION, SOLUTION INTRAMUSCULAR; INTRAVENOUS
Status: DISCONTINUED | OUTPATIENT
Start: 2025-08-11 | End: 2025-08-11 | Stop reason: SDUPTHER

## 2025-08-11 RX ORDER — SODIUM CHLORIDE 0.9 % (FLUSH) 0.9 %
5-40 SYRINGE (ML) INJECTION EVERY 12 HOURS SCHEDULED
Status: CANCELLED | OUTPATIENT
Start: 2025-08-11

## 2025-08-11 RX ORDER — SODIUM CHLORIDE 9 MG/ML
INJECTION, SOLUTION INTRAVENOUS PRN
Status: DISCONTINUED | OUTPATIENT
Start: 2025-08-11 | End: 2025-08-11 | Stop reason: HOSPADM

## 2025-08-11 RX ORDER — HEPARIN 100 UNIT/ML
5 SYRINGE INTRAVENOUS PRN
Status: CANCELLED | OUTPATIENT
Start: 2025-08-11

## 2025-08-11 RX ORDER — LIDOCAINE HYDROCHLORIDE 10 MG/ML
INJECTION, SOLUTION INFILTRATION; PERINEURAL
Status: DISCONTINUED | OUTPATIENT
Start: 2025-08-11 | End: 2025-08-11 | Stop reason: SDUPTHER

## 2025-08-11 RX ORDER — HYDROMORPHONE HYDROCHLORIDE 1 MG/ML
0.5 INJECTION, SOLUTION INTRAMUSCULAR; INTRAVENOUS; SUBCUTANEOUS EVERY 5 MIN PRN
Status: DISCONTINUED | OUTPATIENT
Start: 2025-08-11 | End: 2025-08-11 | Stop reason: HOSPADM

## 2025-08-11 RX ORDER — ACETAMINOPHEN 500 MG
500 TABLET ORAL
Status: COMPLETED | OUTPATIENT
Start: 2025-08-11 | End: 2025-08-11

## 2025-08-11 RX ORDER — SODIUM CHLORIDE 0.9 % (FLUSH) 0.9 %
5-40 SYRINGE (ML) INJECTION PRN
Status: CANCELLED | OUTPATIENT
Start: 2025-08-11

## 2025-08-11 RX ORDER — DEXMEDETOMIDINE HYDROCHLORIDE 100 UG/ML
INJECTION, SOLUTION INTRAVENOUS
Status: DISCONTINUED | OUTPATIENT
Start: 2025-08-11 | End: 2025-08-11 | Stop reason: SDUPTHER

## 2025-08-11 RX ORDER — ACETAMINOPHEN 500 MG
1000 TABLET ORAL
Status: CANCELLED | OUTPATIENT
Start: 2025-08-11 | End: 2025-08-12

## 2025-08-11 RX ORDER — HYDROMORPHONE HYDROCHLORIDE 1 MG/ML
0.25 INJECTION, SOLUTION INTRAMUSCULAR; INTRAVENOUS; SUBCUTANEOUS EVERY 5 MIN PRN
Status: DISCONTINUED | OUTPATIENT
Start: 2025-08-11 | End: 2025-08-11 | Stop reason: HOSPADM

## 2025-08-11 RX ORDER — PROPOFOL 10 MG/ML
INJECTION, EMULSION INTRAVENOUS
Status: DISCONTINUED | OUTPATIENT
Start: 2025-08-11 | End: 2025-08-11 | Stop reason: SDUPTHER

## 2025-08-11 RX ORDER — SODIUM CHLORIDE, SODIUM LACTATE, POTASSIUM CHLORIDE, CALCIUM CHLORIDE 600; 310; 30; 20 MG/100ML; MG/100ML; MG/100ML; MG/100ML
INJECTION, SOLUTION INTRAVENOUS CONTINUOUS
Status: DISCONTINUED | OUTPATIENT
Start: 2025-08-11 | End: 2025-08-11 | Stop reason: HOSPADM

## 2025-08-11 RX ADMIN — PROCHLORPERAZINE EDISYLATE 5 MG: 5 INJECTION INTRAMUSCULAR; INTRAVENOUS at 14:55

## 2025-08-11 RX ADMIN — CEFAZOLIN 2000 MG: 1 INJECTION, POWDER, FOR SOLUTION INTRAMUSCULAR; INTRAVENOUS at 13:43

## 2025-08-11 RX ADMIN — SODIUM CHLORIDE, SODIUM LACTATE, POTASSIUM CHLORIDE, AND CALCIUM CHLORIDE: .6; .31; .03; .02 INJECTION, SOLUTION INTRAVENOUS at 10:16

## 2025-08-11 RX ADMIN — FENTANYL CITRATE 50 MCG: 0.05 INJECTION, SOLUTION INTRAMUSCULAR; INTRAVENOUS at 13:47

## 2025-08-11 RX ADMIN — FENTANYL CITRATE 50 MCG: 0.05 INJECTION, SOLUTION INTRAMUSCULAR; INTRAVENOUS at 14:05

## 2025-08-11 RX ADMIN — ACETAMINOPHEN 500 MG: 500 TABLET ORAL at 15:38

## 2025-08-11 RX ADMIN — DEXMEDETOMIDINE HYDROCHLORIDE 6 MCG: 100 INJECTION, SOLUTION, CONCENTRATE INTRAVENOUS at 13:50

## 2025-08-11 RX ADMIN — PROPOFOL 150 MG: 10 INJECTION, EMULSION INTRAVENOUS at 13:37

## 2025-08-11 RX ADMIN — FENTANYL CITRATE 25 MCG: 0.05 INJECTION, SOLUTION INTRAMUSCULAR; INTRAVENOUS at 13:41

## 2025-08-11 RX ADMIN — FENTANYL CITRATE 25 MCG: 0.05 INJECTION, SOLUTION INTRAMUSCULAR; INTRAVENOUS at 13:37

## 2025-08-11 RX ADMIN — LIDOCAINE HYDROCHLORIDE 50 MG: 10 INJECTION, SOLUTION INFILTRATION; PERINEURAL at 13:37

## 2025-08-11 ASSESSMENT — LIFESTYLE VARIABLES: SMOKING_STATUS: 0

## 2025-08-11 ASSESSMENT — PAIN DESCRIPTION - ORIENTATION: ORIENTATION: RIGHT

## 2025-08-11 ASSESSMENT — PAIN DESCRIPTION - DESCRIPTORS
DESCRIPTORS: ACHING;DISCOMFORT;BURNING
DESCRIPTORS: ACHING;BURNING;DISCOMFORT

## 2025-08-11 ASSESSMENT — PAIN - FUNCTIONAL ASSESSMENT
PAIN_FUNCTIONAL_ASSESSMENT: 0-10
PAIN_FUNCTIONAL_ASSESSMENT: 0-10
PAIN_FUNCTIONAL_ASSESSMENT: ACTIVITIES ARE NOT PREVENTED

## 2025-08-11 ASSESSMENT — PAIN DESCRIPTION - LOCATION: LOCATION: CHEST

## 2025-08-11 ASSESSMENT — PAIN SCALES - GENERAL
PAINLEVEL_OUTOF10: 0
PAINLEVEL_OUTOF10: 7
PAINLEVEL_OUTOF10: 4

## 2025-08-12 ENCOUNTER — HOSPITAL ENCOUNTER (OUTPATIENT)
Dept: INFUSION THERAPY | Age: 65
Discharge: HOME | End: 2025-08-12
Payer: MEDICARE

## 2025-08-12 ENCOUNTER — OFFICE VISIT (OUTPATIENT)
Dept: HEMATOLOGY | Age: 65
End: 2025-08-12
Payer: MEDICARE

## 2025-08-12 VITALS
HEART RATE: 108 BPM | WEIGHT: 244.5 LBS | DIASTOLIC BLOOD PRESSURE: 90 MMHG | TEMPERATURE: 98.6 F | OXYGEN SATURATION: 99 % | HEIGHT: 66 IN | RESPIRATION RATE: 16 BRPM | SYSTOLIC BLOOD PRESSURE: 138 MMHG | BODY MASS INDEX: 39.29 KG/M2

## 2025-08-12 VITALS — HEIGHT: 66 IN | BODY MASS INDEX: 39.46 KG/M2

## 2025-08-12 DIAGNOSIS — R97.8 ELEVATED TUMOR MARKERS: ICD-10-CM

## 2025-08-12 DIAGNOSIS — G89.3 CANCER ASSOCIATED PAIN: ICD-10-CM

## 2025-08-12 DIAGNOSIS — Z51.11 ENCOUNTER FOR CHEMOTHERAPY MANAGEMENT: ICD-10-CM

## 2025-08-12 DIAGNOSIS — C80.1 CANCER (HCC): Primary | ICD-10-CM

## 2025-08-12 DIAGNOSIS — C18.9 ADENOCARCINOMA OF COLON (HCC): Primary | ICD-10-CM

## 2025-08-12 DIAGNOSIS — T45.1X5A ADVERSE EFFECT OF CHEMOTHERAPY, INITIAL ENCOUNTER: ICD-10-CM

## 2025-08-12 DIAGNOSIS — Z71.89 CARE PLAN DISCUSSED WITH PATIENT: ICD-10-CM

## 2025-08-12 DIAGNOSIS — J45.40 MODERATE PERSISTENT ASTHMA WITHOUT COMPLICATION: ICD-10-CM

## 2025-08-12 DIAGNOSIS — Z51.11 CHEMOTHERAPY MANAGEMENT, ENCOUNTER FOR: ICD-10-CM

## 2025-08-12 LAB
ALBUMIN SERPL-MCNC: 3 G/DL (ref 3.5–5.2)
ALP SERPL-CCNC: 388 U/L (ref 35–104)
ALT SERPL-CCNC: 49 U/L (ref 5–33)
ANION GAP SERPL CALCULATED.3IONS-SCNC: 13 MMOL/L (ref 7–19)
AST SERPL-CCNC: 166 U/L (ref 5–32)
BASOPHILS # BLD: 0.06 K/UL (ref 0–0.2)
BASOPHILS NFR BLD: 0.6 % (ref 0–1)
BILIRUB SERPL-MCNC: 1.2 MG/DL (ref 0–1.2)
BUN SERPL-MCNC: 14 MG/DL (ref 8–23)
CALCIUM SERPL-MCNC: 9.2 MG/DL (ref 8.8–10.2)
CANCER AG19-9 SERPL-ACNC: 446 U/ML (ref 0–35)
CEA SERPL-MCNC: 21.1 NG/ML (ref 0–4.7)
CHLORIDE SERPL-SCNC: 97 MMOL/L (ref 98–107)
CO2 SERPL-SCNC: 23 MMOL/L (ref 22–29)
CREAT SERPL-MCNC: 0.5 MG/DL (ref 0.5–0.9)
EOSINOPHIL # BLD: 0.07 K/UL (ref 0–0.6)
EOSINOPHIL NFR BLD: 0.8 % (ref 0–5)
ERYTHROCYTE [DISTWIDTH] IN BLOOD BY AUTOMATED COUNT: 17.2 % (ref 11.5–14.5)
GLUCOSE SERPL-MCNC: 264 MG/DL (ref 70–99)
HCT VFR BLD AUTO: 37.5 % (ref 37–47)
HGB BLD-MCNC: 12.1 G/DL (ref 12–16)
LYMPHOCYTES # BLD: 0.88 K/UL (ref 1.1–4.5)
LYMPHOCYTES NFR BLD: 9.5 % (ref 20–40)
MCH RBC QN AUTO: 28.5 PG (ref 27–31)
MCHC RBC AUTO-ENTMCNC: 32.3 G/DL (ref 33–37)
MCV RBC AUTO: 88.4 FL (ref 81–99)
MONOCYTES # BLD: 0.78 K/UL (ref 0–0.9)
MONOCYTES NFR BLD: 8.4 % (ref 1–10)
NEUTROPHILS # BLD: 7.44 K/UL (ref 1.5–7.5)
NEUTS SEG NFR BLD: 80.4 % (ref 50–65)
PLATELET # BLD AUTO: 435 K/UL (ref 130–400)
PMV BLD AUTO: 9.3 FL (ref 9.4–12.3)
POTASSIUM SERPL-SCNC: 4 MMOL/L (ref 3.5–5.1)
PROT SERPL-MCNC: 7.3 G/DL (ref 6.4–8.3)
RBC # BLD AUTO: 4.24 M/UL (ref 4.2–5.4)
SODIUM SERPL-SCNC: 133 MMOL/L (ref 136–145)
WBC # BLD AUTO: 9.26 K/UL (ref 4.8–10.8)

## 2025-08-12 PROCEDURE — 96415 CHEMO IV INFUSION ADDL HR: CPT

## 2025-08-12 PROCEDURE — G2211 COMPLEX E/M VISIT ADD ON: HCPCS | Performed by: INTERNAL MEDICINE

## 2025-08-12 PROCEDURE — 1123F ACP DISCUSS/DSCN MKR DOCD: CPT | Performed by: INTERNAL MEDICINE

## 2025-08-12 PROCEDURE — 1090F PRES/ABSN URINE INCON ASSESS: CPT | Performed by: INTERNAL MEDICINE

## 2025-08-12 PROCEDURE — NBSRV NON-BILLABLE SERVICE: Performed by: INTERNAL MEDICINE

## 2025-08-12 PROCEDURE — 99214 OFFICE O/P EST MOD 30 MIN: CPT | Performed by: INTERNAL MEDICINE

## 2025-08-12 PROCEDURE — G0498 CHEMO EXTEND IV INFUS W/PUMP: HCPCS

## 2025-08-12 PROCEDURE — 1036F TOBACCO NON-USER: CPT | Performed by: INTERNAL MEDICINE

## 2025-08-12 PROCEDURE — 85025 COMPLETE CBC W/AUTO DIFF WBC: CPT

## 2025-08-12 PROCEDURE — 96366 THER/PROPH/DIAG IV INF ADDON: CPT

## 2025-08-12 PROCEDURE — 3017F COLORECTAL CA SCREEN DOC REV: CPT | Performed by: INTERNAL MEDICINE

## 2025-08-12 PROCEDURE — 86301 IMMUNOASSAY TUMOR CA 19-9: CPT

## 2025-08-12 PROCEDURE — 6360000002 HC RX W HCPCS: Performed by: INTERNAL MEDICINE

## 2025-08-12 PROCEDURE — G8400 PT W/DXA NO RESULTS DOC: HCPCS | Performed by: INTERNAL MEDICINE

## 2025-08-12 PROCEDURE — 80053 COMPREHEN METABOLIC PANEL: CPT

## 2025-08-12 PROCEDURE — 82378 CARCINOEMBRYONIC ANTIGEN: CPT

## 2025-08-12 PROCEDURE — 2580000003 HC RX 258: Performed by: INTERNAL MEDICINE

## 2025-08-12 PROCEDURE — 96375 TX/PRO/DX INJ NEW DRUG ADDON: CPT

## 2025-08-12 PROCEDURE — G8417 CALC BMI ABV UP PARAM F/U: HCPCS | Performed by: INTERNAL MEDICINE

## 2025-08-12 PROCEDURE — 96368 THER/DIAG CONCURRENT INF: CPT

## 2025-08-12 PROCEDURE — G8428 CUR MEDS NOT DOCUMENT: HCPCS | Performed by: INTERNAL MEDICINE

## 2025-08-12 PROCEDURE — 36415 COLL VENOUS BLD VENIPUNCTURE: CPT

## 2025-08-12 PROCEDURE — 96413 CHEMO IV INFUSION 1 HR: CPT

## 2025-08-12 PROCEDURE — 96367 TX/PROPH/DG ADDL SEQ IV INF: CPT

## 2025-08-12 RX ORDER — EPINEPHRINE 1 MG/ML
0.3 INJECTION, SOLUTION, CONCENTRATE INTRAVENOUS PRN
Status: CANCELLED | OUTPATIENT
Start: 2025-08-12

## 2025-08-12 RX ORDER — DEXTROSE MONOHYDRATE 50 MG/ML
5-250 INJECTION, SOLUTION INTRAVENOUS PRN
Status: DISCONTINUED | OUTPATIENT
Start: 2025-08-12 | End: 2025-08-13 | Stop reason: HOSPADM

## 2025-08-12 RX ORDER — ONDANSETRON 4 MG/1
4 TABLET, FILM COATED ORAL EVERY 6 HOURS PRN
Qty: 30 TABLET | Refills: 5 | Status: SHIPPED | OUTPATIENT
Start: 2025-08-12

## 2025-08-12 RX ORDER — DIPHENHYDRAMINE HYDROCHLORIDE 50 MG/ML
50 INJECTION, SOLUTION INTRAMUSCULAR; INTRAVENOUS
Status: DISCONTINUED | OUTPATIENT
Start: 2025-08-12 | End: 2025-08-14 | Stop reason: HOSPADM

## 2025-08-12 RX ORDER — DIPHENHYDRAMINE HYDROCHLORIDE 50 MG/ML
50 INJECTION, SOLUTION INTRAMUSCULAR; INTRAVENOUS
Status: CANCELLED | OUTPATIENT
Start: 2025-08-12

## 2025-08-12 RX ORDER — ALBUTEROL SULFATE 90 UG/1
4 INHALANT RESPIRATORY (INHALATION) PRN
Status: CANCELLED | OUTPATIENT
Start: 2025-08-12

## 2025-08-12 RX ORDER — ACETAMINOPHEN 325 MG/1
650 TABLET ORAL
Status: CANCELLED | OUTPATIENT
Start: 2025-08-12

## 2025-08-12 RX ORDER — FAMOTIDINE 10 MG/ML
20 INJECTION, SOLUTION INTRAVENOUS
Status: CANCELLED | OUTPATIENT
Start: 2025-08-12

## 2025-08-12 RX ORDER — PALONOSETRON 0.05 MG/ML
0.25 INJECTION, SOLUTION INTRAVENOUS ONCE
Status: CANCELLED | OUTPATIENT
Start: 2025-08-12 | End: 2025-08-12

## 2025-08-12 RX ORDER — PALONOSETRON 0.05 MG/ML
0.25 INJECTION, SOLUTION INTRAVENOUS ONCE
Status: COMPLETED | OUTPATIENT
Start: 2025-08-12 | End: 2025-08-12

## 2025-08-12 RX ORDER — MEPERIDINE HYDROCHLORIDE 50 MG/ML
12.5 INJECTION INTRAMUSCULAR; INTRAVENOUS; SUBCUTANEOUS PRN
Status: CANCELLED | OUTPATIENT
Start: 2025-08-12

## 2025-08-12 RX ORDER — SODIUM CHLORIDE 0.9 % (FLUSH) 0.9 %
5-40 SYRINGE (ML) INJECTION PRN
Status: CANCELLED | OUTPATIENT
Start: 2025-08-12

## 2025-08-12 RX ORDER — HEPARIN SODIUM (PORCINE) LOCK FLUSH IV SOLN 100 UNIT/ML 100 UNIT/ML
500 SOLUTION INTRAVENOUS PRN
Status: CANCELLED | OUTPATIENT
Start: 2025-08-12

## 2025-08-12 RX ORDER — ONDANSETRON 2 MG/ML
8 INJECTION INTRAMUSCULAR; INTRAVENOUS
Status: CANCELLED | OUTPATIENT
Start: 2025-08-12

## 2025-08-12 RX ORDER — HYDROCORTISONE SODIUM SUCCINATE 100 MG/2ML
100 INJECTION INTRAMUSCULAR; INTRAVENOUS
Status: CANCELLED | OUTPATIENT
Start: 2025-08-12

## 2025-08-12 RX ORDER — SODIUM CHLORIDE 9 MG/ML
5-250 INJECTION, SOLUTION INTRAVENOUS PRN
OUTPATIENT
Start: 2025-08-14

## 2025-08-12 RX ORDER — FAMOTIDINE 10 MG/ML
20 INJECTION, SOLUTION INTRAVENOUS
Status: DISCONTINUED | OUTPATIENT
Start: 2025-08-12 | End: 2025-08-14 | Stop reason: HOSPADM

## 2025-08-12 RX ORDER — SODIUM CHLORIDE 9 MG/ML
INJECTION, SOLUTION INTRAVENOUS PRN
Status: CANCELLED | OUTPATIENT
Start: 2025-08-12

## 2025-08-12 RX ORDER — HEPARIN SODIUM (PORCINE) LOCK FLUSH IV SOLN 100 UNIT/ML 100 UNIT/ML
500 SOLUTION INTRAVENOUS PRN
Status: CANCELLED | OUTPATIENT
Start: 2025-08-14

## 2025-08-12 RX ORDER — DEXAMETHASONE SODIUM PHOSPHATE 10 MG/ML
10 INJECTION, SOLUTION INTRAMUSCULAR; INTRAVENOUS ONCE
Status: COMPLETED | OUTPATIENT
Start: 2025-08-12 | End: 2025-08-12

## 2025-08-12 RX ORDER — SODIUM CHLORIDE 0.9 % (FLUSH) 0.9 %
5-40 SYRINGE (ML) INJECTION PRN
Status: CANCELLED | OUTPATIENT
Start: 2025-08-14

## 2025-08-12 RX ORDER — DEXTROSE MONOHYDRATE 50 MG/ML
5-250 INJECTION, SOLUTION INTRAVENOUS PRN
Status: CANCELLED | OUTPATIENT
Start: 2025-08-12

## 2025-08-12 RX ORDER — TRAMADOL HYDROCHLORIDE 50 MG/1
50 TABLET ORAL EVERY 6 HOURS PRN
Qty: 120 TABLET | Refills: 0 | Status: SHIPPED | OUTPATIENT
Start: 2025-08-12 | End: 2025-09-11

## 2025-08-12 RX ORDER — SODIUM CHLORIDE 9 MG/ML
5-250 INJECTION, SOLUTION INTRAVENOUS PRN
Status: CANCELLED | OUTPATIENT
Start: 2025-08-12

## 2025-08-12 RX ADMIN — FLUOROURACIL 5500 MG: 50 INJECTION, SOLUTION INTRAVENOUS at 14:01

## 2025-08-12 RX ADMIN — DEXAMETHASONE SODIUM PHOSPHATE 10 MG: 10 INJECTION INTRAMUSCULAR; INTRAVENOUS at 11:35

## 2025-08-12 RX ADMIN — OXALIPLATIN 195 MG: 5 INJECTION, SOLUTION INTRAVENOUS at 11:47

## 2025-08-12 RX ADMIN — PALONOSETRON 0.25 MG: 0.05 INJECTION, SOLUTION INTRAVENOUS at 11:35

## 2025-08-12 RX ADMIN — LEUCOVORIN CALCIUM 200 MG: 200 INJECTION, POWDER, LYOPHILIZED, FOR SUSPENSION INTRAMUSCULAR; INTRAVENOUS at 12:18

## 2025-08-12 RX ADMIN — DEXTROSE MONOHYDRATE 5 ML/HR: 50 INJECTION, SOLUTION INTRAVENOUS at 11:46

## 2025-08-13 LAB
DNA RANGE(S) EXAMINED NAR: NORMAL
GENE DIS ANL INTERP-IMP: POSITIVE
GENE DIS ASSESSED: NORMAL
GENE MUT TESTED BLD/T: 5.8 M/MB
HLA-A HIGH RES: NORMAL
HLA-A UNRESLVD ALLELES HIGH RES: YES
HLA-B HIGH RES: NORMAL
HLA-B UNRESLVD ALLELES HIGH RES: YES
HLA-C HIGH RES: NORMAL
HLA-C UNRESLVD ALLELES HIGH RES: YES
MSI CA SPEC-IMP: NORMAL
REASON FOR STUDY: NORMAL
TEMPUS GERMLINE NOTE: NORMAL
TEMPUS HLA-A SAMPLE TYPE: NORMAL
TEMPUS HLA-B SAMPLE TYPE: NORMAL
TEMPUS HLA-C SAMPLE TYPE: NORMAL
TEMPUS LCA: NORMAL
TEMPUS PERTINENTNEGATIVES: NORMAL
TEMPUS PORTAL: NORMAL
TEMPUS THERAPY1: NORMAL
TEMPUS THERAPYCOUNT: 1
TEMPUS TRIAL1: NORMAL
TEMPUS TRIAL3: NORMAL
TEMPUS TRIALCOUNT: 3
TEMPUS TRIALMATCHES2: NORMAL
TEMPUS XR RESULT 1: NORMAL

## 2025-08-14 ENCOUNTER — HOSPITAL ENCOUNTER (OUTPATIENT)
Dept: INFUSION THERAPY | Age: 65
Discharge: HOME | End: 2025-08-14
Payer: MEDICARE

## 2025-08-14 DIAGNOSIS — C18.9 ADENOCARCINOMA OF COLON (HCC): Primary | ICD-10-CM

## 2025-08-14 PROCEDURE — 6360000002 HC RX W HCPCS: Performed by: INTERNAL MEDICINE

## 2025-08-14 PROCEDURE — 96523 IRRIG DRUG DELIVERY DEVICE: CPT

## 2025-08-14 PROCEDURE — 2500000003 HC RX 250 WO HCPCS: Performed by: INTERNAL MEDICINE

## 2025-08-14 RX ORDER — SODIUM CHLORIDE 0.9 % (FLUSH) 0.9 %
5-40 SYRINGE (ML) INJECTION PRN
Status: DISCONTINUED | OUTPATIENT
Start: 2025-08-14 | End: 2025-08-15 | Stop reason: HOSPADM

## 2025-08-14 RX ORDER — HEPARIN 100 UNIT/ML
500 SYRINGE INTRAVENOUS PRN
Status: DISCONTINUED | OUTPATIENT
Start: 2025-08-14 | End: 2025-08-15 | Stop reason: HOSPADM

## 2025-08-14 RX ADMIN — HEPARIN 500 UNITS: 100 SYRINGE at 10:21

## 2025-08-14 RX ADMIN — SODIUM CHLORIDE, PRESERVATIVE FREE 10 ML: 5 INJECTION INTRAVENOUS at 10:21

## 2025-08-22 LAB
Lab: NEGATIVE
Lab: NORMAL

## 2025-08-26 ENCOUNTER — OFFICE VISIT (OUTPATIENT)
Dept: HEMATOLOGY | Age: 65
End: 2025-08-26
Payer: MEDICARE

## 2025-08-26 ENCOUNTER — HOSPITAL ENCOUNTER (OUTPATIENT)
Dept: INFUSION THERAPY | Age: 65
Discharge: HOME | End: 2025-08-26
Payer: MEDICARE

## 2025-08-26 ENCOUNTER — OFFICE VISIT (OUTPATIENT)
Dept: VASCULAR SURGERY | Age: 65
End: 2025-08-26

## 2025-08-26 VITALS
HEIGHT: 66 IN | BODY MASS INDEX: 39.49 KG/M2 | TEMPERATURE: 98.1 F | OXYGEN SATURATION: 98 % | RESPIRATION RATE: 18 BRPM | HEART RATE: 87 BPM | WEIGHT: 245.7 LBS | DIASTOLIC BLOOD PRESSURE: 84 MMHG | SYSTOLIC BLOOD PRESSURE: 112 MMHG

## 2025-08-26 VITALS
DIASTOLIC BLOOD PRESSURE: 76 MMHG | SYSTOLIC BLOOD PRESSURE: 110 MMHG | OXYGEN SATURATION: 96 % | TEMPERATURE: 98.4 F | HEART RATE: 88 BPM

## 2025-08-26 VITALS — BODY MASS INDEX: 39.66 KG/M2 | HEIGHT: 66 IN

## 2025-08-26 DIAGNOSIS — Z51.11 ENCOUNTER FOR CHEMOTHERAPY MANAGEMENT: ICD-10-CM

## 2025-08-26 DIAGNOSIS — C18.9 ADENOCARCINOMA OF COLON (HCC): ICD-10-CM

## 2025-08-26 DIAGNOSIS — C18.9 ADENOCARCINOMA OF COLON (HCC): Primary | ICD-10-CM

## 2025-08-26 DIAGNOSIS — I87.8 POOR VENOUS ACCESS: Primary | ICD-10-CM

## 2025-08-26 DIAGNOSIS — Z51.11 ENCOUNTER FOR CHEMOTHERAPY MANAGEMENT: Primary | ICD-10-CM

## 2025-08-26 DIAGNOSIS — R97.8 ELEVATED TUMOR MARKERS: ICD-10-CM

## 2025-08-26 LAB
ALBUMIN SERPL-MCNC: 2.9 G/DL (ref 3.5–5.2)
ALP SERPL-CCNC: 281 U/L (ref 35–104)
ALT SERPL-CCNC: 34 U/L (ref 5–33)
ANION GAP SERPL CALCULATED.3IONS-SCNC: 11 MMOL/L (ref 7–19)
AST SERPL-CCNC: 73 U/L (ref 5–32)
BASOPHILS # BLD: 0.02 K/UL (ref 0–0.2)
BASOPHILS NFR BLD: 0.4 % (ref 0–1)
BILIRUB SERPL-MCNC: 0.7 MG/DL (ref 0–1.2)
BUN SERPL-MCNC: 13 MG/DL (ref 8–23)
CALCIUM SERPL-MCNC: 8.8 MG/DL (ref 8.8–10.2)
CANCER AG19-9 SERPL-ACNC: 412 U/ML (ref 0–35)
CEA SERPL-MCNC: 14.2 NG/ML (ref 0–4.7)
CHLORIDE SERPL-SCNC: 97 MMOL/L (ref 98–107)
CO2 SERPL-SCNC: 24 MMOL/L (ref 22–29)
CREAT SERPL-MCNC: 0.5 MG/DL (ref 0.5–0.9)
EOSINOPHIL # BLD: 0.04 K/UL (ref 0–0.6)
EOSINOPHIL NFR BLD: 0.9 % (ref 0–5)
ERYTHROCYTE [DISTWIDTH] IN BLOOD BY AUTOMATED COUNT: 15.9 % (ref 11.5–14.5)
GLUCOSE SERPL-MCNC: 272 MG/DL (ref 70–99)
HCT VFR BLD AUTO: 34.2 % (ref 37–47)
HGB BLD-MCNC: 11 G/DL (ref 12–16)
LYMPHOCYTES # BLD: 0.76 K/UL (ref 1.1–4.5)
LYMPHOCYTES NFR BLD: 16.9 % (ref 20–40)
MCH RBC QN AUTO: 28.5 PG (ref 27–31)
MCHC RBC AUTO-ENTMCNC: 32.2 G/DL (ref 33–37)
MCV RBC AUTO: 88.6 FL (ref 81–99)
MONOCYTES # BLD: 0.63 K/UL (ref 0–0.9)
MONOCYTES NFR BLD: 14 % (ref 1–10)
NEUTROPHILS # BLD: 3.03 K/UL (ref 1.5–7.5)
NEUTS SEG NFR BLD: 67.6 % (ref 50–65)
PLATELET # BLD AUTO: 377 K/UL (ref 130–400)
PMV BLD AUTO: 9.5 FL (ref 9.4–12.3)
POTASSIUM SERPL-SCNC: 4.1 MMOL/L (ref 3.5–5.1)
PROT SERPL-MCNC: 6.9 G/DL (ref 6.4–8.3)
PROTEIN UA: NEGATIVE
RBC # BLD AUTO: 3.86 M/UL (ref 4.2–5.4)
SODIUM SERPL-SCNC: 132 MMOL/L (ref 136–145)
WBC # BLD AUTO: 4.49 K/UL (ref 4.8–10.8)

## 2025-08-26 PROCEDURE — 96368 THER/DIAG CONCURRENT INF: CPT

## 2025-08-26 PROCEDURE — 96367 TX/PROPH/DG ADDL SEQ IV INF: CPT

## 2025-08-26 PROCEDURE — 36415 COLL VENOUS BLD VENIPUNCTURE: CPT

## 2025-08-26 PROCEDURE — 85025 COMPLETE CBC W/AUTO DIFF WBC: CPT

## 2025-08-26 PROCEDURE — 80053 COMPREHEN METABOLIC PANEL: CPT

## 2025-08-26 PROCEDURE — 86301 IMMUNOASSAY TUMOR CA 19-9: CPT

## 2025-08-26 PROCEDURE — 6360000002 HC RX W HCPCS: Performed by: INTERNAL MEDICINE

## 2025-08-26 PROCEDURE — 99024 POSTOP FOLLOW-UP VISIT: CPT | Performed by: NURSE PRACTITIONER

## 2025-08-26 PROCEDURE — 96413 CHEMO IV INFUSION 1 HR: CPT

## 2025-08-26 PROCEDURE — 81002 URINALYSIS NONAUTO W/O SCOPE: CPT | Performed by: INTERNAL MEDICINE

## 2025-08-26 PROCEDURE — 96366 THER/PROPH/DIAG IV INF ADDON: CPT

## 2025-08-26 PROCEDURE — 96415 CHEMO IV INFUSION ADDL HR: CPT

## 2025-08-26 PROCEDURE — G0498 CHEMO EXTEND IV INFUS W/PUMP: HCPCS

## 2025-08-26 PROCEDURE — 96417 CHEMO IV INFUS EACH ADDL SEQ: CPT

## 2025-08-26 PROCEDURE — 82378 CARCINOEMBRYONIC ANTIGEN: CPT

## 2025-08-26 PROCEDURE — 96375 TX/PRO/DX INJ NEW DRUG ADDON: CPT

## 2025-08-26 PROCEDURE — 2580000003 HC RX 258: Performed by: INTERNAL MEDICINE

## 2025-08-26 RX ORDER — SODIUM CHLORIDE 9 MG/ML
INJECTION, SOLUTION INTRAVENOUS PRN
Status: CANCELLED | OUTPATIENT
Start: 2025-08-26

## 2025-08-26 RX ORDER — PALONOSETRON 0.05 MG/ML
0.25 INJECTION, SOLUTION INTRAVENOUS ONCE
Status: COMPLETED | OUTPATIENT
Start: 2025-08-26 | End: 2025-08-26

## 2025-08-26 RX ORDER — DEXAMETHASONE SODIUM PHOSPHATE 10 MG/ML
10 INJECTION, SOLUTION INTRAMUSCULAR; INTRAVENOUS ONCE
Status: CANCELLED | OUTPATIENT
Start: 2025-08-26 | End: 2025-08-26

## 2025-08-26 RX ORDER — SODIUM CHLORIDE 9 MG/ML
5-250 INJECTION, SOLUTION INTRAVENOUS PRN
OUTPATIENT
Start: 2025-08-28

## 2025-08-26 RX ORDER — MEPERIDINE HYDROCHLORIDE 25 MG/ML
12.5 INJECTION INTRAMUSCULAR; INTRAVENOUS; SUBCUTANEOUS PRN
Status: CANCELLED | OUTPATIENT
Start: 2025-08-26

## 2025-08-26 RX ORDER — EPINEPHRINE 1 MG/ML
0.3 INJECTION, SOLUTION, CONCENTRATE INTRAVENOUS PRN
Status: CANCELLED | OUTPATIENT
Start: 2025-08-26

## 2025-08-26 RX ORDER — ONDANSETRON 2 MG/ML
8 INJECTION INTRAMUSCULAR; INTRAVENOUS
Status: CANCELLED | OUTPATIENT
Start: 2025-08-26

## 2025-08-26 RX ORDER — SODIUM CHLORIDE 0.9 % (FLUSH) 0.9 %
5-40 SYRINGE (ML) INJECTION PRN
Status: CANCELLED | OUTPATIENT
Start: 2025-08-26

## 2025-08-26 RX ORDER — PALONOSETRON 0.05 MG/ML
0.25 INJECTION, SOLUTION INTRAVENOUS ONCE
Status: CANCELLED | OUTPATIENT
Start: 2025-08-26 | End: 2025-08-26

## 2025-08-26 RX ORDER — HYDROCORTISONE SODIUM SUCCINATE 100 MG/2ML
100 INJECTION INTRAMUSCULAR; INTRAVENOUS
Status: CANCELLED | OUTPATIENT
Start: 2025-08-26

## 2025-08-26 RX ORDER — DEXTROSE MONOHYDRATE 50 MG/ML
5-250 INJECTION, SOLUTION INTRAVENOUS PRN
Status: CANCELLED | OUTPATIENT
Start: 2025-08-26

## 2025-08-26 RX ORDER — HEPARIN 100 UNIT/ML
500 SYRINGE INTRAVENOUS PRN
Status: CANCELLED | OUTPATIENT
Start: 2025-08-26

## 2025-08-26 RX ORDER — HEPARIN 100 UNIT/ML
500 SYRINGE INTRAVENOUS PRN
Status: CANCELLED | OUTPATIENT
Start: 2025-08-28

## 2025-08-26 RX ORDER — DIPHENHYDRAMINE HYDROCHLORIDE 50 MG/ML
50 INJECTION, SOLUTION INTRAMUSCULAR; INTRAVENOUS
Status: CANCELLED | OUTPATIENT
Start: 2025-08-26

## 2025-08-26 RX ORDER — ACETAMINOPHEN 325 MG/1
650 TABLET ORAL
Status: CANCELLED | OUTPATIENT
Start: 2025-08-26

## 2025-08-26 RX ORDER — SODIUM CHLORIDE 9 MG/ML
5-250 INJECTION, SOLUTION INTRAVENOUS PRN
Status: CANCELLED | OUTPATIENT
Start: 2025-08-26

## 2025-08-26 RX ORDER — ALBUTEROL SULFATE 90 UG/1
4 INHALANT RESPIRATORY (INHALATION) PRN
Status: CANCELLED | OUTPATIENT
Start: 2025-08-26

## 2025-08-26 RX ORDER — SODIUM CHLORIDE 0.9 % (FLUSH) 0.9 %
5-40 SYRINGE (ML) INJECTION PRN
Status: CANCELLED | OUTPATIENT
Start: 2025-08-28

## 2025-08-26 RX ORDER — DEXAMETHASONE SODIUM PHOSPHATE 10 MG/ML
10 INJECTION, SOLUTION INTRAMUSCULAR; INTRAVENOUS ONCE
Status: COMPLETED | OUTPATIENT
Start: 2025-08-26 | End: 2025-08-26

## 2025-08-26 RX ADMIN — OXALIPLATIN 115 MG: 5 INJECTION, SOLUTION INTRAVENOUS at 12:42

## 2025-08-26 RX ADMIN — LEUCOVORIN CALCIUM 200 MG: 200 INJECTION, POWDER, LYOPHILIZED, FOR SUSPENSION INTRAMUSCULAR; INTRAVENOUS at 12:41

## 2025-08-26 RX ADMIN — PALONOSETRON 0.25 MG: 0.05 INJECTION, SOLUTION INTRAVENOUS at 12:00

## 2025-08-26 RX ADMIN — SODIUM CHLORIDE 600 MG: 9 INJECTION, SOLUTION INTRAVENOUS at 12:09

## 2025-08-26 RX ADMIN — FLUOROURACIL 5500 MG: 50 INJECTION, SOLUTION INTRAVENOUS at 14:47

## 2025-08-26 RX ADMIN — DEXAMETHASONE SODIUM PHOSPHATE 10 MG: 10 INJECTION, SOLUTION INTRAMUSCULAR; INTRAVENOUS at 12:00

## 2025-08-28 ENCOUNTER — OFFICE VISIT (OUTPATIENT)
Dept: VASCULAR SURGERY | Age: 65
End: 2025-08-28

## 2025-08-28 ENCOUNTER — HOSPITAL ENCOUNTER (OUTPATIENT)
Dept: INFUSION THERAPY | Age: 65
Discharge: HOME | End: 2025-08-28
Payer: MEDICARE

## 2025-08-28 VITALS
RESPIRATION RATE: 18 BRPM | DIASTOLIC BLOOD PRESSURE: 90 MMHG | SYSTOLIC BLOOD PRESSURE: 136 MMHG | HEART RATE: 88 BPM | OXYGEN SATURATION: 100 % | TEMPERATURE: 98.6 F

## 2025-08-28 VITALS
OXYGEN SATURATION: 98 % | DIASTOLIC BLOOD PRESSURE: 88 MMHG | HEART RATE: 82 BPM | SYSTOLIC BLOOD PRESSURE: 136 MMHG | TEMPERATURE: 97.9 F

## 2025-08-28 DIAGNOSIS — I87.8 POOR VENOUS ACCESS: Primary | ICD-10-CM

## 2025-08-28 DIAGNOSIS — C18.9 ADENOCARCINOMA OF COLON (HCC): Primary | ICD-10-CM

## 2025-08-28 PROCEDURE — 2500000003 HC RX 250 WO HCPCS: Performed by: INTERNAL MEDICINE

## 2025-08-28 PROCEDURE — 96523 IRRIG DRUG DELIVERY DEVICE: CPT

## 2025-08-28 PROCEDURE — 99024 POSTOP FOLLOW-UP VISIT: CPT | Performed by: NURSE PRACTITIONER

## 2025-08-28 PROCEDURE — 6360000002 HC RX W HCPCS: Performed by: INTERNAL MEDICINE

## 2025-08-28 RX ORDER — HEPARIN 100 UNIT/ML
500 SYRINGE INTRAVENOUS PRN
Status: DISCONTINUED | OUTPATIENT
Start: 2025-08-28 | End: 2025-08-29 | Stop reason: HOSPADM

## 2025-08-28 RX ORDER — DOXYCYCLINE HYCLATE 100 MG
100 TABLET ORAL 2 TIMES DAILY
Qty: 20 TABLET | Refills: 0 | Status: SHIPPED | OUTPATIENT
Start: 2025-08-28

## 2025-08-28 RX ORDER — SODIUM CHLORIDE 0.9 % (FLUSH) 0.9 %
5-40 SYRINGE (ML) INJECTION PRN
Status: DISCONTINUED | OUTPATIENT
Start: 2025-08-28 | End: 2025-08-29 | Stop reason: HOSPADM

## 2025-08-28 RX ADMIN — SODIUM CHLORIDE, PRESERVATIVE FREE 10 ML: 5 INJECTION INTRAVENOUS at 10:52

## 2025-08-28 RX ADMIN — HEPARIN 500 UNITS: 100 SYRINGE at 10:52

## (undated) DEVICE — SUTURE MONOCRYL + SZ 4-0 L18IN ABSRB UD L19MM PS-2 3/8 CIR MCP496G

## (undated) DEVICE — FORCEPS BX L240CM JAW DIA2.4MM ORNG L CAP W/ NDL DISP RAD

## (undated) DEVICE — PENCIL BTTN S S CAUT TIP W HOLSTER 25 50

## (undated) DEVICE — SUTURE PROL SZ 2-0 L36IN NONABSORBABLE BLU V-7 L26MM 1/2 8977H

## (undated) DEVICE — COVER PRB 48X6 IN 20 CC W/BND TAPE STRL GEL FLEXI-FEEL

## (undated) DEVICE — DRAPE C ARM W42XL120IN W POLY STRP W OUT LENS

## (undated) DEVICE — Device

## (undated) DEVICE — TOWEL SURG W17XL27IN BLU COT DLX PREWASHED DELINTED 4 PER PK

## (undated) DEVICE — DRAPE EQUIP FLROSCP 36X20 IN ADH STRL LF

## (undated) DEVICE — DRAPE SURG UTIL W/ TAPE XL POLYPR 20IN X 30IN STD N FEN

## (undated) DEVICE — GOWN SURG 2XL UNIQUE FBR ROYALSILK

## (undated) DEVICE — SUTURE ABSORBABLE MONOFILAMENT 3-0 SH 27 IN UD PDS + PDP416H

## (undated) DEVICE — CATHETER KIT 5 FR 21 GAX7 CM MICROINTRODUCER GUIDEWIRE STIFF

## (undated) DEVICE — ENDOSCOPIC ULTRASOUND FINE NEEDLE BIOPSY (FNB) DEVICE: Brand: ACQUIRE™ S

## (undated) DEVICE — DRAPE SURG IOBAN W17XL23IN FAB ANTIMIC GEN INCIS LNR FULL W HNDL

## (undated) DEVICE — GLOVE SURG SZ 7 CRM LTX FREE POLYISOPRENE POLYMER BEAD ANTI

## (undated) DEVICE — INTRODUCER SHTH 0.018 IN 5 FRX15 CM 21 GAX7 CM MINI STK MAX

## (undated) DEVICE — ADHESIVE SKIN CLOSURE TOP 0.8 CC PREM PUR LIQUIBAND RAPID LF

## (undated) DEVICE — ENDO KIT,LOURDES HOSPITAL: Brand: MEDLINE INDUSTRIES, INC.

## (undated) DEVICE — GOWN SURG AAMI LVL 4 XL HK LOOP CLOS BLU STRL ORBIS LF

## (undated) DEVICE — SUTURE VICRYL + SZ 3-0 L27IN ABSRB UD L26MM SH 1/2 CIR VCP416H

## (undated) DEVICE — SUTURE MONOCRYL STRATAFIX SPRL + SZ 4-0 L12IN ABSRB UD PS-2 SXMP1B117

## (undated) DEVICE — NEEDLE HYPO 25GA L1.5IN STD S STL POLYPR HUB REG BVL ULT